# Patient Record
Sex: FEMALE | Employment: FULL TIME | URBAN - METROPOLITAN AREA
[De-identification: names, ages, dates, MRNs, and addresses within clinical notes are randomized per-mention and may not be internally consistent; named-entity substitution may affect disease eponyms.]

---

## 2024-01-23 ENCOUNTER — TELEPHONE (OUTPATIENT)
Dept: OBGYN CLINIC | Facility: CLINIC | Age: 19
End: 2024-01-23

## 2024-02-01 NOTE — TELEPHONE ENCOUNTER
Patient states 2023 had a Normal/Vaginal Delivery with [facility], prior Delivery and recent lab reports requested.

## 2024-02-06 ENCOUNTER — OFFICE VISIT (OUTPATIENT)
Dept: OBGYN CLINIC | Facility: CLINIC | Age: 19
End: 2024-02-06
Payer: COMMERCIAL

## 2024-02-06 VITALS
BODY MASS INDEX: 31.71 KG/M2 | SYSTOLIC BLOOD PRESSURE: 120 MMHG | HEIGHT: 63 IN | DIASTOLIC BLOOD PRESSURE: 60 MMHG | WEIGHT: 179 LBS

## 2024-02-06 DIAGNOSIS — Z34.91 PREGNANCY WITH UNCERTAIN DATES IN FIRST TRIMESTER: Primary | ICD-10-CM

## 2024-02-06 PROCEDURE — 99203 OFFICE O/P NEW LOW 30 MIN: CPT | Performed by: OBSTETRICS & GYNECOLOGY

## 2024-02-06 PROCEDURE — 76801 OB US < 14 WKS SINGLE FETUS: CPT | Performed by: OBSTETRICS & GYNECOLOGY

## 2024-02-06 PROCEDURE — 76817 TRANSVAGINAL US OBSTETRIC: CPT | Performed by: OBSTETRICS & GYNECOLOGY

## 2024-02-06 RX ORDER — PROGESTERONE 200 MG/1
CAPSULE ORAL EVERY 12 HOURS
COMMUNITY
Start: 2024-01-26

## 2024-02-06 NOTE — ASSESSMENT & PLAN NOTE
I discussed findings of ultrasound with patient. I discussed either miscarriage or just early pregnancy. She will RTO in 1 week for repeat u/s. If no change, then will discuss next steps.

## 2024-02-06 NOTE — PROGRESS NOTES
[facility] [place]  [address] [place] [address]    Assessment/Plan:  1. Pregnancy with uncertain dates in first trimester  Assessment & Plan:  I discussed findings of ultrasound with patient. I discussed either miscarriage or just early pregnancy. She will RTO in 1 week for repeat u/s. If no change, then will discuss next steps.     Orders:  -     AMB US OB < 14 weeks single or first gestation level 1        Subjective:   Janice Wallace is a 18 y.o.  .  CC: No chief complaint on file.      HPI: 19yo  @ 7wks by dates presents for an early u/s. She was seen with a previous gyn who started her on progesterone supplements due to progesterone dropping from 9 to 7. She reports no complaints. She was on OCPs, stopped for a month and then got pregnant. She has a 10mo old daughter at home.     ROS: Negative except as noted in HPI    Patient's last menstrual period was 12/15/2023.       She  has no history on file for sexual activity.       The following portions of the patient's history were reviewed and updated as appropriate:   History reviewed. No pertinent past medical history.  History reviewed. No pertinent surgical history.  History reviewed. No pertinent family history.  Social History     Socioeconomic History    Marital status: /Civil Union     Spouse name: None    Number of children: None    Years of education: None    Highest education level: None   Occupational History    None   Tobacco Use    Smoking status: Never     Passive exposure: Never    Smokeless tobacco: Never   Substance and Sexual Activity    Alcohol use: None    Drug use: None    Sexual activity: None   Other Topics Concern    None   Social History Narrative    None     Social Determinants of Health     Financial Resource Strain: Not on file   Food Insecurity: Not on file   Transportation Needs: Not on file   Physical Activity: Not on file   Stress: Not on file   Social Connections: Not on file  "  Intimate Partner Violence: Not on file   Housing Stability: Not on file     Outpatient Medications Marked as Taking for the 2/6/24 encounter (Office Visit) with Irving WALTER DO   Medication    Progesterone 200 MG CAPS     No Known Allergies        Objective:  /60 (BP Location: Right arm, Patient Position: Sitting, Cuff Size: Standard)   Ht 5' 3\" (1.6 m)   Wt 81.2 kg (179 lb)   LMP 12/15/2023   BMI 31.71 kg/m²        Chaperone present? Pt declined.    General Appearance: alert and oriented, in no acute distress.   Abdomen: Soft, non-tender, non-distended, no masses, no rebound or guarding.  Pelvic u/s: IUP with yolk sac and very small fetal pole. No cardiac motion seen, however was difficult exam  Extremities: Normal range of motion.   Skin: normal, no rash or abnormalities  Neurologic: alert, oriented x3  Psychiatric: Appropriate affect, mood stable, cooperative with exam.        Irving Joya DO  2/6/2024 2:25 PM   "

## 2024-02-14 ENCOUNTER — TELEPHONE (OUTPATIENT)
Dept: OBGYN CLINIC | Facility: CLINIC | Age: 19
End: 2024-02-14

## 2024-02-14 ENCOUNTER — OFFICE VISIT (OUTPATIENT)
Dept: OBGYN CLINIC | Facility: CLINIC | Age: 19
End: 2024-02-14
Payer: COMMERCIAL

## 2024-02-14 VITALS
DIASTOLIC BLOOD PRESSURE: 78 MMHG | HEIGHT: 63 IN | WEIGHT: 180 LBS | BODY MASS INDEX: 31.89 KG/M2 | SYSTOLIC BLOOD PRESSURE: 130 MMHG

## 2024-02-14 DIAGNOSIS — Z3A.01 6 WEEKS GESTATION OF PREGNANCY: ICD-10-CM

## 2024-02-14 DIAGNOSIS — Z34.81 ENCOUNTER FOR SUPERVISION OF OTHER NORMAL PREGNANCY, FIRST TRIMESTER: ICD-10-CM

## 2024-02-14 DIAGNOSIS — N91.1 AMENORRHEA, SECONDARY: Primary | ICD-10-CM

## 2024-02-14 PROCEDURE — 76817 TRANSVAGINAL US OBSTETRIC: CPT | Performed by: NURSE PRACTITIONER

## 2024-02-14 PROCEDURE — 99213 OFFICE O/P EST LOW 20 MIN: CPT | Performed by: NURSE PRACTITIONER

## 2024-02-14 RX ORDER — CHOLECALCIFEROL (VITAMIN D3) 25 MCG
TABLET,CHEWABLE ORAL
COMMUNITY

## 2024-02-14 NOTE — PROGRESS NOTES
"[facility] - [place]  [address] [place] [address]    Assessment/Plan:  Reviewed general pregnancy care, warning signs.  Nurse intake visit in 2 weeks, initial OB visit in 4 weeks.   Schedule appointment with MFM.    1. Amenorrhea, secondary    2. Encounter for supervision of other normal pregnancy, first trimester  -     [facility] OB < 14 weeks single or first gestation level 1  -     Ambulatory Referral to Maternal Fetal Medicine; Future; Expected date: 02/15/2024    3. 6 weeks gestation of pregnancy        Subjective:   Janice Wallace is a 18 y.o.  female.  CC: amenorrhea, positive pregnancy test      HPI:   18 year old  presents to office for follow up ultrasound.  Had positive pregnancy test and inconclusive ultrasound in office one week ago, which demonstrated a yolk sac and very small fetal pole, no FHR identified. Pt reports LMP 2023, placing her at 8 weeks.  She denies pain or bleeding and does have nausea and fatigue.          Gyn History  Patient's last menstrual period was 12/15/2023.       Last pap smear: Not on file    She  has no history on file for sexual activity.       OB History      No past medical history on file.     No past surgical history on file.     Social History     Tobacco Use    Smoking status: Never     Passive exposure: Never    Smokeless tobacco: Never          Current Outpatient Medications:     Prenatal Vit-Fe Sulfate-FA-DHA (Prenatal Vitamin/Min +DHA) 27-0.8-200 MG CAPS, Take by mouth, Disp: , Rfl:     Progesterone 200 MG CAPS, Every 12 hours, Disp: , Rfl:     She has No Known Allergies..    ROS: Review of Systems negative except as noted in HPI    Objective:  /78 (BP Location: Right arm, Patient Position: Sitting, Cuff Size: Standard)   Ht 5' 3\" (1.6 m)   Wt 81.6 kg (180 lb)   LMP 12/15/2023   BMI 31.89 kg/m²      Physical Exam  Constitutional:       General: She is not in acute distress.     Appearance: Normal " appearance.   Abdominal:      Palpations: Abdomen is soft.      Tenderness: There is no abdominal tenderness.      Comments: Transvaginal utlrasound demonstrates SIUP measuring 6 weeks 6 days by CRL, CM noted at 124 BPM.   Skin:     General: Skin is warm and dry.

## 2024-02-14 NOTE — TELEPHONE ENCOUNTER
2/14/24 patient has an upcoming intake appointment on 2/28/24 at 1:00 pm in person and initial appointment with Dr. Joya  3/21/24 at 1:30 pm in [place].

## 2024-02-14 NOTE — PATIENT INSTRUCTIONS
Reviewed general pregnancy care, warning signs.  Nurse intake visit in 2 weeks, initial OB visit in 4 weeks.   Schedule appointment with MFM.

## 2024-02-28 ENCOUNTER — PATIENT OUTREACH (OUTPATIENT)
Dept: OBGYN CLINIC | Facility: CLINIC | Age: 19
End: 2024-02-28

## 2024-02-28 ENCOUNTER — INITIAL PRENATAL (OUTPATIENT)
Dept: OBGYN CLINIC | Facility: CLINIC | Age: 19
End: 2024-02-28
Payer: COMMERCIAL

## 2024-02-28 VITALS
HEIGHT: 62 IN | SYSTOLIC BLOOD PRESSURE: 122 MMHG | DIASTOLIC BLOOD PRESSURE: 78 MMHG | WEIGHT: 178 LBS | BODY MASS INDEX: 32.76 KG/M2

## 2024-02-28 DIAGNOSIS — Z83.3 FAMILY HISTORY OF DIABETES MELLITUS TYPE II: ICD-10-CM

## 2024-02-28 DIAGNOSIS — Z36.89 ENCOUNTER FOR OTHER SPECIFIED ANTENATAL SCREENING: ICD-10-CM

## 2024-02-28 DIAGNOSIS — Z3A.09 9 WEEKS GESTATION OF PREGNANCY: ICD-10-CM

## 2024-02-28 DIAGNOSIS — E66.09 OTHER OBESITY DUE TO EXCESS CALORIES AFFECTING PREGNANCY IN FIRST TRIMESTER: Primary | ICD-10-CM

## 2024-02-28 DIAGNOSIS — O99.211 OTHER OBESITY DUE TO EXCESS CALORIES AFFECTING PREGNANCY IN FIRST TRIMESTER: Primary | ICD-10-CM

## 2024-02-28 PROCEDURE — T1001 NURSING ASSESSMENT/EVALUATN: HCPCS | Performed by: OBSTETRICS & GYNECOLOGY

## 2024-02-28 NOTE — PROGRESS NOTES
SW referred for initial prenatal assessment. Patient is . Patient agreeable to meeting with SW today.    Patient reports this pregnancy is unplanned but welcomed. She and FOB ( Juno) live in [place] with their 10 month old daughter Gigi. Patient and FOB both drive. 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 Patient denies current financial concerns or need for parenting education or baby supply resources. Already has MA ([facility]) and [place] - provided patient with [employer] flyer.  Nata stated she will provide patient with WIC letter at check out.     Patient denies current or h/o mental health, substance use, DV/IPV, CYS involvement, and legal concerns. Patient indicates good support from FOB, her father, step mom, sister, and mom (does not live locally). Patient enjoys watching TV and relaxing on the couch for stress relief.     No SW needs identified at this time, SW closing referral. Please re-refer as needed.

## 2024-02-28 NOTE — PROGRESS NOTES
OB INTAKE INTERVIEW  Patient is 18 y.o.y.o. who presents for OB intake at 8.6  wks.  Pt is a transfer from [facility]. Pt desires to establish care with Shira.    She is accompanied by herself during this encounter  The father of her baby Juno is involved in the pregnancy and is 24 years old.    G 3P1SAB 1  Last Menstrual Period: 23  Ultrasound: Measured done on 24 by Sandra Naidu:  IMPRESSION:    Will assign dating based on today's ultrasound.  Final CHRISTOPHER: 10/03/2024  Gestational age: 6 weeks 6 days  Fetal cardiac activity detected.  No adnexal masses seen.     Estimated Date of Delivery: 10/03/24 confirmed by US     Signs/Symptoms of Pregnancy  Current pregnancy symptoms:  Nausea: Advised feeling nauseous during the first three months of pregnancy is common. Try to eat 5-6 small meals a day, increase protein with meals/snacks, in order to keep stomach full at all times. Try bland foods like plain crackers, toast as well as carbonated drinks like gingerale. Hard peppermint or campos candy, is a natural treatment for nausea,  B- pops  with B6 ( available at the pharmacy), B6 25 mg in the AM and 25 mg in PM. May combine with Unisom 12.5mg at night. Unisom may cause drowsiness.  High complex carbohydrate before bedtime. If symptoms worsen, unable to keep fluids down without vomiting for more than 12 hours, contact the office.   Constipation no  Headaches no  Cramping/spotting no  PICA cravings no    Diabetes-  There is no height or weight on file to calculate BMI.  If patient has 1 or more, please order early 1 hour GTT  History of GDM no  BMI >35 no  History of PCOS or current metformin use no  History of LGA/macrosomic infant (4000g/9lbs) no    If patient has 2 or more, please order early 1 hour GTT  BMI>30 YES, BMI- 32.56   AMA no  First degree relative with type 2 diabetes YES  History of chronic HTN, hyperlipidemia, elevated A1C no  High risk race (, ,  ,  or ) YES,      Hypertension- if you answer yes to any of the following, please order baseline preeclampsia labs (cbc, comprehensive metabolic panel, urine protein creatinine ratio, uric acid)  History of of chronic HTN no  History of gestational HTN no  History of preeclampsia, eclampsia, or HELLP syndrome no  History of diabetes no  History of lupus, autoimmune disease, kidney disease no    Thyroid- if yes order TSH with reflex T4  History of thyroid disease no    Bleeding Disorder or Hx of DVT-patient or first degree relative with history of. Order the following if not done previously.   (Factor V, antithrombin III, prothrombin gene mutation, protein C and S Ag, lupus anticoagulant, anticardiolipin, beta-2 glycoprotein)   no    OB/GYN-  History of abnormal pap smear no, Pt is 18 yrs of age, pap deferred until 19 yrs of age        Date of last pap smear N/A, Pt will need GC/Chlamydia swab at 3/21/24 appointment   History of HPV no  History of Herpes/HSV no  History of other STI (gonorrhea, chlamydia, trich) no  History of prior  YES-2023  History of prior  no  History of  delivery prior to 36 weeks 6 days no  History of blood transfusion no  Ok for blood transfusion Yes     Substance screening-   History of tobacco use no  Currently using tobacco no  Substance Use Screen Level: Not a current or former tobacco user    MRSA Screening-   Does the pt have a hx of MRSA? no    Immunizations:  Influenza vaccine given this season Discussed  Discussed Tdap vaccine Discussed    Discussed COVID Vaccine Discussed     Genetic/MFM-  Do you or your partner have a history of any of the following in yourselves or first degree relatives?  Cystic fibrosis no  Spinal muscular atrophy no  Hemoglobinopathy/Sickle Cell/Thalassemia no  Fragile X Intellectual Disability no    If yes, discuss Carrier Screening and recommend consultation with MFM/Genetic Counseling and place  specific [facility] Referral for.    If no, discuss Carrier Screening being completed once in a lifetime as a standard of care lab test. Place orders for Cystic Fibrosis Gene Test (NRO613) and Spinal Muscular Atrophy DNA ([id number])  SMA/CF screening  done 8/23/22-see scanned results      Appointment for Nuchal Translucency Ultrasound at [facility] scheduled for Pt has an appointment scheduled with [facility] on 3/20/24      Interview education  [facility] Pregnancy Essentials Book reviewed, discussed and attached to their AVS: Yes     Ambulatory referral placed for      Prenatal lab work scripts Yes   Extra labs ordered:  Early 1 hour gtt     Aspirin/Preeclampsia Screen    Risk Level Risk Factor Recommendation   LOW Prior Uncomplicated full-term delivery YES No Aspirin recommendation        MODERATE Nulliparity no Recommend low-dose aspirin if     BMI>30 YES 2 or more moderate risk factors    Family History Preeclampsia (mother/sister) no     35yr old or greater no      or Low Socioeconomic no     IVF Pregnancy  no     Personal History Risks (low birth weight, prior adverse preg outcome, >10yr preg interval) no         HIGH History of Preeclampsia no Recommend low-dose aspirin if     Multifetal gestation no 1 or more high risk factors    Chronic HTN no     Type 1 or 2 Diabetes no     Renal Disease no     Autoimmune Disease  no      Contraindications to ASA therapy:  NSAID/ ASA allergy: no  Nasal polyps: no  Asthma with history of ASA induced bronchospasm: no  Relative contraindications:  History of GI bleed: no  Active peptic ulcer disease: no  Severe hepatic dysfunction: no    Patient should be recommended to take ASA 162mg during this pregnancy from 12-36wks to lower her risk of preeclampsia: Low risk, to be further reviewed and discussed at Initial PN visit on 3/21/24      The patient has a history now or in prior pregnancy notable for:        Details that I feel the provider should be aware of:   Pt  delivered 4/2023, vaginal delivery, uncomplicated pregnancy course.    Early hour gtt ordered -see risk factors.     PN1 visit scheduled. The patient was oriented to our practice, the navigator role, reviewed delivering physicians and [facility] for Delivery. All questions were answered.    Interviewed by: NIALL Hendrickson RN    [FreeTextEntry1] : I reassured the patient that there was no need for testing at this point and she should followup in 9 months .\par BP well controlled.\par Lipids controlled\par Carotid doppler benign.\par EKG unchanged

## 2024-02-28 NOTE — PATIENT INSTRUCTIONS
Congratulations!! Please review our Pregnancy Essential Guide for informative education and here is a link to take a irtual tour of our [facility].     [facility] Pregnancy Essentials Guide  [facility] ([email or web address])       [facility] Tour on [facility]

## 2024-03-20 ENCOUNTER — ROUTINE PRENATAL (OUTPATIENT)
Dept: PERINATAL CARE | Facility: OTHER | Age: 19
End: 2024-03-20
Payer: COMMERCIAL

## 2024-03-20 ENCOUNTER — INITIAL PRENATAL (OUTPATIENT)
Dept: OBGYN CLINIC | Facility: CLINIC | Age: 19
End: 2024-03-20
Payer: COMMERCIAL

## 2024-03-20 VITALS
DIASTOLIC BLOOD PRESSURE: 52 MMHG | BODY MASS INDEX: 31.36 KG/M2 | WEIGHT: 177 LBS | HEIGHT: 63 IN | SYSTOLIC BLOOD PRESSURE: 110 MMHG

## 2024-03-20 VITALS
DIASTOLIC BLOOD PRESSURE: 70 MMHG | WEIGHT: 177.2 LBS | HEIGHT: 63 IN | HEART RATE: 95 BPM | SYSTOLIC BLOOD PRESSURE: 118 MMHG | BODY MASS INDEX: 31.4 KG/M2

## 2024-03-20 DIAGNOSIS — Z3A.11 11 WEEKS GESTATION OF PREGNANCY: ICD-10-CM

## 2024-03-20 DIAGNOSIS — Z36.82 ENCOUNTER FOR NUCHAL TRANSLUCENCY TESTING: ICD-10-CM

## 2024-03-20 DIAGNOSIS — Z11.3 SCREENING EXAMINATION FOR VENEREAL DISEASE: ICD-10-CM

## 2024-03-20 DIAGNOSIS — Z34.81 PRENATAL CARE, SUBSEQUENT PREGNANCY, FIRST TRIMESTER: Primary | ICD-10-CM

## 2024-03-20 DIAGNOSIS — Z34.81 ENCOUNTER FOR SUPERVISION OF OTHER NORMAL PREGNANCY, FIRST TRIMESTER: ICD-10-CM

## 2024-03-20 DIAGNOSIS — Z34.91 PREGNANCY WITH UNCERTAIN DATES IN FIRST TRIMESTER: Primary | ICD-10-CM

## 2024-03-20 LAB
EXTERNAL CHLAMYDIA SCREEN: NEGATIVE
EXTERNAL GONORRHEA SCREEN: NEGATIVE
SL AMB  POCT GLUCOSE, UA: NEGATIVE
SL AMB POCT URINE PROTEIN: NEGATIVE

## 2024-03-20 PROCEDURE — 76813 OB US NUCHAL MEAS 1 GEST: CPT | Performed by: OBSTETRICS & GYNECOLOGY

## 2024-03-20 PROCEDURE — 76801 OB US < 14 WKS SINGLE FETUS: CPT | Performed by: OBSTETRICS & GYNECOLOGY

## 2024-03-20 PROCEDURE — 36415 COLL VENOUS BLD VENIPUNCTURE: CPT | Performed by: OBSTETRICS & GYNECOLOGY

## 2024-03-20 PROCEDURE — 99243 OFF/OP CNSLTJ NEW/EST LOW 30: CPT | Performed by: OBSTETRICS & GYNECOLOGY

## 2024-03-20 PROCEDURE — 81002 URINALYSIS NONAUTO W/O SCOPE: CPT | Performed by: PHYSICIAN ASSISTANT

## 2024-03-20 PROCEDURE — 99213 OFFICE O/P EST LOW 20 MIN: CPT | Performed by: PHYSICIAN ASSISTANT

## 2024-03-20 NOTE — ASSESSMENT & PLAN NOTE
Reviewed can stop progesterone at 12 weeks.   STD cultures done. Initial prenatal labs reprinted for patient.   Reviewed sequential screen vs NIPT, has MFM appt this afternoon. Planning for NIPT. Reviewed AFP to evaluate for ONTD, will provide script at future visit.   Return to office for ob check in 4 weeks.

## 2024-03-20 NOTE — PROGRESS NOTES
"Janice presents today for a genetic screening ultrasound.  This is her third pregnancy.  Her first pregnancy resulted in a miscarriage followed by a full-term vaginal livery without complications approximately 8 months ago.  She has a BMI of 31.  She has no other significant contributory medical, surgical, substance use, or family history.  A review of systems is otherwise negative.    We discussed the options for genetic screening, including but not limited to first trimester screening, second trimester screening, combined first and second trimester screening, noninvasive prenatal screening (NIPS) for patients at high risk and diagnostic screening through the use of CVS and amniocentesis. We discussed the risks and benefits of each approach including the sensitivities and false positive rates as well as the difference between a screening test and a diagnostic test. At the conclusion of our discussion the patient elected noninvasive prenatal screening utilizing the MaterniT 21 plus test. The patient had this bloowork drawn in the office.   The results should be available in approximately 7-10 days.    The patient has a short interpregnancy interval of less than 9 months which is associated with increased risk for adverse pregnancy outcomes including but not limited to  labor and fetal growth restriction.  Recommend micronutrient supplementation utilizing prenatal vitamins and a follow-up 3rd trimester growth ultrasound.      We discussed follow-up in detail and I recommend an anatomy ultrasound be scheduled for 20 weeks gestation.    Thank you very much for allowing us to participate in the care of this very nice patient. Should you have any questions, please do not hesitate to contact me.    Portions of the record may have been created with voice recognition software. Occasional wrong word or \"sound a like\" substitutions may have occurred due to the inherent limitations of voice recognition software. Read " the chart carefully and recognize, using context, where substitutions have occurred.

## 2024-03-20 NOTE — PROGRESS NOTES
Patient chose to have [facility] [id number] Non-Invasive Prenatal Screen [id number] PLUS w/ SCA, WITH fetal sex (per pt request).  Patient choose billed through insurance.     Patient given brochure and is aware [facility] will contact patient's insurance and coordinate coverage.  Provided [facility] contact information. General inquiries [phone], Cost estimates [phone] and [facility] Billing [phone]. Website [email or web address].     Blood collection tubes labeled with patient identifiers (name, medical record number, and date of birth).     Filled out [facility] order form. Patient chose to have blood drawn in our Maternal Fetal Medicine office. Blood work drawn by ANY Mirza MA.    If patient had blood work in office: Blood drawn from left arm. Needle used with a straight needle.     If patient chose to have blood work drawn at a [facility] lab we requested patient notify MFM (via phone call or [employer] message) when blood collected so office can follow up on results.     Copy of lab order scanned to Epic media.     Maternal Fetal Medicine will have results in approximately 5-7 business days and will call patient or notify via [employer].  Patient aware viewing lab result online will reveal fetal sex if ordered.    Patient verbalized understanding of all instructions and no questions at this time.

## 2024-03-20 NOTE — LETTER
2024     Sandra Naidu, HUMBERTO  [address]  [place] [address]    Patient: Janice Wallace   YOB: 2005   Date of Visit: 3/20/2024       Dear Dr. Naidu:    Thank you for referring Janice Wallace to me for evaluation. Below are my notes for this consultation.    If you have questions, please do not hesitate to call me. I look forward to following your patient along with you.         Sincerely,        Irwin Elena MD        CC: No Recipients    Irwin Elena MD  3/20/2024  3:26 PM  Sign when Signing Visit  Janice presents today for a genetic screening ultrasound.  This is her third pregnancy.  Her first pregnancy resulted in a miscarriage followed by a full-term vaginal livery without complications approximately 8 months ago.  She has a BMI of 31.  She has no other significant contributory medical, surgical, substance use, or family history.  A review of systems is otherwise negative.    We discussed the options for genetic screening, including but not limited to first trimester screening, second trimester screening, combined first and second trimester screening, noninvasive prenatal screening (NIPS) for patients at high risk and diagnostic screening through the use of CVS and amniocentesis. We discussed the risks and benefits of each approach including the sensitivities and false positive rates as well as the difference between a screening test and a diagnostic test. At the conclusion of our discussion the patient elected noninvasive prenatal screening utilizing the MaterniT 21 plus test. The patient had this bloowork drawn in the office.   The results should be available in approximately 7-10 days.    The patient has a short interpregnancy interval of less than 9 months which is associated with increased risk for adverse pregnancy outcomes including but not limited to  labor and fetal growth restriction.  Recommend  "micronutrient supplementation utilizing prenatal vitamins and a follow-up 3rd trimester growth ultrasound.      We discussed follow-up in detail and I recommend an anatomy ultrasound be scheduled for 20 weeks gestation.    Thank you very much for allowing us to participate in the care of this very nice patient. Should you have any questions, please do not hesitate to contact me.    Portions of the record may have been created with voice recognition software. Occasional wrong word or \"sound a like\" substitutions may have occurred due to the inherent limitations of voice recognition software. Read the chart carefully and recognize, using context, where substitutions have occurred.     "

## 2024-03-20 NOTE — PROGRESS NOTES
"Routine Prenatal Visit  [facility] [place]  [address] [place] [address]    Assessment/Plan:  Janice is a 18 y.o. year old  at 11w6d who presents for routine prenatal visit.     1. Prenatal care, subsequent pregnancy, first trimester    2. 11 weeks gestation of pregnancy  Assessment & Plan:  Reviewed can stop progesterone at 12 weeks.   STD cultures done. Initial prenatal labs reprinted for patient.   Reviewed sequential screen vs NIPT, has MFM appt this afternoon. Planning for NIPT. Reviewed AFP to evaluate for ONTD, will provide script at future visit.   Return to office for ob check in 4 weeks.       Orders:  -     POCT urine dip    3. Screening examination for venereal disease  -     Chlamydia/GC CRYSTAL, Confirmation        Next OB Visit 4 weeks.    Subjective:     CC: Prenatal care    Janice Wallace is a 18 y.o.  female who presents for routine prenatal care at 11w6d.  Pregnancy ROS: Reports nausea, no vomiting. No FM, HA, cramping, VB, LOF, edema, domestic violence, or smoking. Tolerating PNV.      The following portions of the patient's history were reviewed and updated as appropriate: allergies, current medications, past family history, past medical history, obstetric history, gynecologic history, past social history, past surgical history and problem list.      Objective:  /52   Ht 5' 3\" (1.6 m)   Wt 80.3 kg (177 lb)   LMP 2023   BMI 31.35 kg/m²   Pregravid Weight/BMI: Pregravid weight not on file (BMI Could not be calculated)  Current Weight: 80.3 kg (177 lb)   Total Weight Gain: Not found.   Pre-Christina Vitals      Flowsheet Row Most Recent Value   Prenatal Assessment    Fetal Heart Rate 150   Fundal Height (cm) 12 cm   Movement Absent   Prenatal Vitals    Blood Pressure 110/52   Weight - Scale 80.3 kg (177 lb)   Urine Albumin/Glucose    Dilation/Effacement/Station    Vaginal Drainage    Edema    LLE Edema None   RLE Edema None   Facial Edema " None             General: Well appearing, no distress  Abdomen: Soft, gravid, nontender  Fundal Height: Appropriate for gestational age.  Extremities: Warm and well perfused.  Non tender.

## 2024-03-25 LAB
C TRACH RRNA SPEC QL NAA+PROBE: NEGATIVE
CFDNA.FET/CFDNA.TOTAL SFR FETUS: NORMAL %
CITATION REF LAB TEST: NORMAL
FET 13+18+21+X+Y ANEUP PLAS.CFDNA: NEGATIVE
FET CHR 21 TS PLAS.CFDNA QL: NEGATIVE
FET CHR 21 TS PLAS.CFDNA QL: NEGATIVE
FET MS X RISK WBC.DNA+CFDNA QL: NOT DETECTED
FET SEX PLAS.CFDNA DOSAGE CFDNA: NORMAL
FET TS 13 RISK PLAS.CFDNA QL: NEGATIVE
FET X + Y ANEUP RISK PLAS.CFDNA SEQ-IMP: NOT DETECTED
GA EST FROM CONCEPTION DATE: NORMAL D
GESTATIONAL AGE > 9:: YES
LAB DIRECTOR NAME PROVIDER: NORMAL
LAB DIRECTOR NAME PROVIDER: NORMAL
LABORATORY COMMENT REPORT: NORMAL
LIMITATIONS OF THE TEST: NORMAL
N GONORRHOEA RRNA SPEC QL NAA+PROBE: NEGATIVE
NEGATIVE PREDICTIVE VALUE: NORMAL
PERFORMANCE CHARACTERISTICS: NORMAL
POSITIVE PREDICTIVE VALUE: NORMAL
REF LAB TEST METHOD: NORMAL
SL AMB NOTE:: NORMAL
TEST PERFORMANCE INFO SPEC: NORMAL

## 2024-03-27 NOTE — RESULT ENCOUNTER NOTE
I have reviewed the results of the NIPS which are low risk.  Please call patient and notify her of these reassuring results if she has not viewed on MyChart. Please ensure she is notified of recommendation of MSAFP to be ordered and followed up through her primary Obstetrician's office.      Thank you, Irwin Elena MD

## 2024-04-05 ENCOUNTER — TELEPHONE (OUTPATIENT)
Dept: OBGYN CLINIC | Facility: CLINIC | Age: 19
End: 2024-04-05

## 2024-04-05 NOTE — TELEPHONE ENCOUNTER
"Overall how are you doing? Feeling good     Compliant with routine OB care appointments? Yes     Have you completed your 1st trimester labs? Pt has not completed First PN labs, pt states, \" she forgot\" encouraged to have done prior to her appointment on 4/16/24. Pt verbalized abn understanding.     If you had NIPS with MFM, do you have a order for MSAFP? To be ordered 4/16/24   Can be completed 15w-22w9d, ideally 16w-18w    Have you seen MFM and do you have your detailed US scheduled? 5/21/24     Pregnancy Education-have you had a chance to review the classes offered and registered? Reviewed classes    EPDS Score 2  "

## 2024-04-10 NOTE — TELEPHONE ENCOUNTER
First PN labs results still no completed. Follow-up call made to patient, pt informed she is having her blood work drawn today.

## 2024-04-15 LAB
EXTERNAL ANTIBODY SCREEN: NORMAL
EXTERNAL HEMOGLOBIN: 11.6 G/DL
EXTERNAL HEPATITIS B SURFACE ANTIGEN: NEGATIVE
EXTERNAL HIV-1/2 AB-AG: NORMAL
EXTERNAL PLATELET COUNT: 275 K/ÂΜL
EXTERNAL RH FACTOR: POSITIVE
EXTERNAL RUBELLA IGG QUANTITATION: NORMAL
EXTERNAL SYPHILIS TOTAL IGG/IGM SCREENING: NORMAL

## 2024-04-16 ENCOUNTER — ROUTINE PRENATAL (OUTPATIENT)
Dept: OBGYN CLINIC | Facility: CLINIC | Age: 19
End: 2024-04-16
Payer: COMMERCIAL

## 2024-04-16 VITALS
HEIGHT: 63 IN | DIASTOLIC BLOOD PRESSURE: 76 MMHG | BODY MASS INDEX: 31.71 KG/M2 | WEIGHT: 179 LBS | SYSTOLIC BLOOD PRESSURE: 120 MMHG

## 2024-04-16 DIAGNOSIS — Z3A.15 15 WEEKS GESTATION OF PREGNANCY: ICD-10-CM

## 2024-04-16 DIAGNOSIS — O09.892 SHORT INTERVAL BETWEEN PREGNANCIES AFFECTING PREGNANCY IN SECOND TRIMESTER, ANTEPARTUM: Primary | ICD-10-CM

## 2024-04-16 DIAGNOSIS — O99.212 SEVERE OBESITY DUE TO EXCESS CALORIES AFFECTING PREGNANCY IN SECOND TRIMESTER (HCC): ICD-10-CM

## 2024-04-16 DIAGNOSIS — E66.01 SEVERE OBESITY DUE TO EXCESS CALORIES AFFECTING PREGNANCY IN SECOND TRIMESTER (HCC): ICD-10-CM

## 2024-04-16 LAB
SL AMB  POCT GLUCOSE, UA: NORMAL
SL AMB POCT URINE PROTEIN: NORMAL

## 2024-04-16 PROCEDURE — 81002 URINALYSIS NONAUTO W/O SCOPE: CPT | Performed by: OBSTETRICS & GYNECOLOGY

## 2024-04-16 PROCEDURE — 99213 OFFICE O/P EST LOW 20 MIN: CPT | Performed by: OBSTETRICS & GYNECOLOGY

## 2024-04-16 NOTE — PROGRESS NOTES
"Routine Prenatal Visit  [facility] - [place]  [address] [place] [address]    Assessment/Plan:  Janice is a 18 y.o. year old  at 15w5d who presents for routine prenatal visit.     1. Short interval between pregnancies affecting pregnancy in second trimester, antepartum  Assessment & Plan:  Will get repeat growth u/s in 3rd trimester      2. 15 weeks gestation of pregnancy  -     POCT urine dip  -     Alpha fetoprotein, maternal; Future  -     Alpha fetoprotein, maternal    3. Severe obesity due to excess calories affecting pregnancy in second trimester (HCC)          Subjective:   Janice Wallace is a 18 y.o.  who presents for routine prenatal care at 15w5d.  Complaints today: Denies. Went yesterday to have her labs done  LOF: -; VB: -; Contractions: -; FM: -    Objective:  /76 (BP Location: Right arm, Patient Position: Sitting, Cuff Size: Standard)   Ht 5' 3\" (1.6 m)   Wt 81.2 kg (179 lb)   LMP 2023   BMI 31.71 kg/m²     General: Well appearing, no distress  Respiratory: Unlabored breathing  Abdomen: Soft, gravid, nontender  Extremities: Warm and well perfused.  Non tender.    Pregravid Weight/BMI: Pregravid weight not on file (BMI Could not be calculated)  Current Weight: 81.2 kg (179 lb)   Total Weight Gain: Not found.     Pre-Christina Vitals      Flowsheet Row Most Recent Value   Prenatal Assessment    Fetal Heart Rate 155   Movement Absent   Prenatal Vitals    Blood Pressure 120/76   Weight - Scale 81.2 kg (179 lb)   Urine Albumin/Glucose    Dilation/Effacement/Station    Vaginal Drainage    Edema              Shil JOSE ANGEL Joya DO  2024 11:13 AM    "

## 2024-04-17 LAB
ABO GROUP BLD: ABNORMAL
APPEARANCE UR: CLEAR
BACTERIA UR CULT: ABNORMAL
BACTERIA UR CULT: NO GROWTH
BACTERIA URNS QL MICRO: NORMAL
BASOPHILS # BLD AUTO: 0 X10E3/UL (ref 0–0.2)
BASOPHILS NFR BLD AUTO: 0 %
BILIRUB UR QL STRIP: NEGATIVE
BLD GP AB SCN SERPL QL: NEGATIVE
C TRACH RRNA SPEC QL NAA+PROBE: NEGATIVE
CASTS URNS QL MICRO: NORMAL /LPF
COLOR UR: YELLOW
EOSINOPHIL # BLD AUTO: 0.3 X10E3/UL (ref 0–0.4)
EOSINOPHIL NFR BLD AUTO: 3 %
EPI CELLS #/AREA URNS HPF: NORMAL /HPF (ref 0–10)
ERYTHROCYTE [DISTWIDTH] IN BLOOD BY AUTOMATED COUNT: 14.1 % (ref 11.7–15.4)
GLUCOSE 1H P 50 G GLC PO SERPL-MCNC: 86 MG/DL (ref 70–139)
GLUCOSE UR QL: NEGATIVE
HBV SURFACE AG SERPL QL IA: NEGATIVE
HCT VFR BLD AUTO: 36.3 % (ref 34–46.6)
HCV AB S/CO SERPL IA: NON REACTIVE
HGB BLD-MCNC: 11.6 G/DL (ref 11.1–15.9)
HGB UR QL STRIP: NEGATIVE
HIV 1+2 AB+HIV1 P24 AG SERPL QL IA: NON REACTIVE
IMM GRANULOCYTES # BLD: 0.1 X10E3/UL (ref 0–0.1)
IMM GRANULOCYTES NFR BLD: 1 %
KETONES UR QL STRIP: NEGATIVE
LEUKOCYTE ESTERASE UR QL STRIP: NEGATIVE
LYMPHOCYTES # BLD AUTO: 2.6 X10E3/UL (ref 0.7–3.1)
LYMPHOCYTES NFR BLD AUTO: 26 %
MCH RBC QN AUTO: 25.6 PG (ref 26.6–33)
MCHC RBC AUTO-ENTMCNC: 32 G/DL (ref 31.5–35.7)
MCV RBC AUTO: 80 FL (ref 79–97)
MICRO URNS: ABNORMAL
MICRO URNS: ABNORMAL
MONOCYTES # BLD AUTO: 0.7 X10E3/UL (ref 0.1–0.9)
MONOCYTES NFR BLD AUTO: 7 %
N GONORRHOEA RRNA SPEC QL NAA+PROBE: NEGATIVE
NEUTROPHILS # BLD AUTO: 6.3 X10E3/UL (ref 1.4–7)
NEUTROPHILS NFR BLD AUTO: 63 %
NITRITE UR QL STRIP: NEGATIVE
PH UR STRIP: 6 [PH] (ref 5–7.5)
PLATELET # BLD AUTO: 275 X10E3/UL (ref 150–450)
PROT UR QL STRIP: NEGATIVE
RBC # BLD AUTO: 4.53 X10E6/UL (ref 3.77–5.28)
RBC #/AREA URNS HPF: NORMAL /HPF (ref 0–2)
RH BLD: POSITIVE
RPR SER QL: NON REACTIVE
RUBV IGG SERPL IA-ACNC: 1.18 INDEX
SL AMB INTERPRETATION: NORMAL
SP GR UR: 1.02 (ref 1–1.03)
UROBILINOGEN UR STRIP-ACNC: 0.2 MG/DL (ref 0.2–1)
WBC # BLD AUTO: 10 X10E3/UL (ref 3.4–10.8)
WBC #/AREA URNS HPF: NORMAL /HPF (ref 0–5)

## 2024-04-22 LAB
2ND TRIMESTER 4 SCREEN SERPL-IMP: NORMAL
AFP ADJ MOM SERPL: 0.88
AFP INTERP AMN-IMP: NORMAL
AFP INTERP SERPL-IMP: NORMAL
AFP INTERP SERPL-IMP: NORMAL
AFP SERPL-MCNC: 25.3 NG/ML
AGE AT DELIVERY: 21.1 YR
GA METHOD: NORMAL
GA: 15.7 WEEKS
IDDM PATIENT QL: NO
MULTIPLE PREGNANCY: NO
NEURAL TUBE DEFECT RISK FETUS: NORMAL %

## 2024-05-15 ENCOUNTER — ROUTINE PRENATAL (OUTPATIENT)
Dept: OBGYN CLINIC | Facility: CLINIC | Age: 19
End: 2024-05-15
Payer: COMMERCIAL

## 2024-05-15 VITALS
WEIGHT: 181 LBS | DIASTOLIC BLOOD PRESSURE: 74 MMHG | SYSTOLIC BLOOD PRESSURE: 112 MMHG | BODY MASS INDEX: 32.07 KG/M2 | HEIGHT: 63 IN

## 2024-05-15 DIAGNOSIS — O26.899 PELVIC PAIN IN PREGNANCY: ICD-10-CM

## 2024-05-15 DIAGNOSIS — Z3A.19 19 WEEKS GESTATION OF PREGNANCY: ICD-10-CM

## 2024-05-15 DIAGNOSIS — R10.2 PELVIC PAIN IN PREGNANCY: ICD-10-CM

## 2024-05-15 DIAGNOSIS — O09.892 SHORT INTERVAL BETWEEN PREGNANCIES AFFECTING PREGNANCY IN SECOND TRIMESTER, ANTEPARTUM: Primary | ICD-10-CM

## 2024-05-15 DIAGNOSIS — E66.01 SEVERE OBESITY DUE TO EXCESS CALORIES AFFECTING PREGNANCY IN SECOND TRIMESTER (HCC): ICD-10-CM

## 2024-05-15 DIAGNOSIS — O99.212 SEVERE OBESITY DUE TO EXCESS CALORIES AFFECTING PREGNANCY IN SECOND TRIMESTER (HCC): ICD-10-CM

## 2024-05-15 DIAGNOSIS — M54.9 BACK PAIN IN PREGNANCY: ICD-10-CM

## 2024-05-15 DIAGNOSIS — O99.891 BACK PAIN IN PREGNANCY: ICD-10-CM

## 2024-05-15 LAB
SL AMB  POCT GLUCOSE, UA: NORMAL
SL AMB POCT URINE PROTEIN: NORMAL

## 2024-05-15 PROCEDURE — 99213 OFFICE O/P EST LOW 20 MIN: CPT | Performed by: STUDENT IN AN ORGANIZED HEALTH CARE EDUCATION/TRAINING PROGRAM

## 2024-05-15 PROCEDURE — 81002 URINALYSIS NONAUTO W/O SCOPE: CPT | Performed by: STUDENT IN AN ORGANIZED HEALTH CARE EDUCATION/TRAINING PROGRAM

## 2024-05-15 NOTE — PROGRESS NOTES
"Routine Prenatal Visit  [facility] - [address], [address], [place] [address]    Assessment/Plan:  Janice is a 18 y.o. year old  at 19w6d who presents for routine prenatal visit.     1. Short interval between pregnancies affecting pregnancy in second trimester, antepartum  2. Severe obesity due to excess calories affecting pregnancy in second trimester (HCC)  3. 19 weeks gestation of pregnancy  Assessment & Plan:  - Continue prenatal vitamin with folic acid.  - Aneuploidy screening low risk. msAFP low risk.   - Level II anatomy ultrasound scheduled with Maternal Fetal Medicine.  - Problem list updated, results console reviewed and updated with pertinent prenatal labs.  - PMH, PSH, medications reviewed and updated as needed.  - Return to office in 4 wk(s) for routine prenatal care.    Orders:  -     POCT urine dip  4. Pelvic pain in pregnancy  Assessment & Plan:  - Referred to PT for evaluation and treatment in the setting of pelvic and back pain.   Orders:  -     Ambulatory Referral to Physical Therapy; Future  5. Back pain in pregnancy  -     Ambulatory Referral to Physical Therapy; Future        Subjective:   Janice Wallace is a 18 y.o.  who presents for routine prenatal care at 19w6d.  Complaints today: No  LOF: No; VB: No; Contractions: No; FM: Flutters    Objective:  /74 (BP Location: Left arm, Patient Position: Sitting, Cuff Size: Standard)   Ht 5' 3\" (1.6 m)   Wt 82.1 kg (181 lb)   LMP 2023   BMI 32.06 kg/m²     General: Well appearing, no distress  Respiratory: Unlabored breathing  Cardiovascular: Regular rate.  Abdomen: Soft, gravid, nontender  Extremities: Warm and well perfused.  Non tender.    Pregravid Weight/BMI: Pregravid weight not on file (BMI Could not be calculated)  Current Weight: 82.1 kg (181 lb)   Total Weight Gain: Not found.     Pre- Vitals      Flowsheet Row Most Recent Value   Prenatal Assessment    Fetal Heart " Rate 150   Fundal Height (cm) 20 cm   Movement Present   Prenatal Vitals    Blood Pressure 112/74   Weight - Scale 82.1 kg (181 lb)   Urine Albumin/Glucose    Dilation/Effacement/Station    Vaginal Drainage    Draining Fluid No   Edema    LLE Edema None   RLE Edema None   Facial Edema None             Kevin Rodas MD  5/15/2024 6:49 PM

## 2024-05-15 NOTE — ASSESSMENT & PLAN NOTE
- Continue prenatal vitamin with folic acid.  - Aneuploidy screening low risk. msAFP low risk.   - Level II anatomy ultrasound scheduled with Maternal Fetal Medicine.  - Problem list updated, results console reviewed and updated with pertinent prenatal labs.  - PMH, PSH, medications reviewed and updated as needed.  - Return to office in 4 wk(s) for routine prenatal care.

## 2024-05-21 ENCOUNTER — TELEPHONE (OUTPATIENT)
Age: 19
End: 2024-05-21

## 2024-05-21 NOTE — TELEPHONE ENCOUNTER
Pt arrived at 2:50 PM for 2:30 PM detailed ultrasound with MFM. Pt was upset that she had driven an hour for her appt and is unable to be seen has she was 20 minutes late for her appt. Apologized to pt and explained late policy.    Appts offered:  5/24 8 AM and 12:45 PM at [place]  5/28 8 AM at [place]  5/29 8 AM at [place]  6/6 8 AM, 10:15 AM, and 12:45 PM at [place]  6/7 8 or 10:15 AM at [place]    Pt declined appts, she will be on vacation week of 5/25 and works Monday through Friday. Pt stated she would like to think about it and call back to reschedule.    DISCHARGE

## 2024-05-21 NOTE — TELEPHONE ENCOUNTER
Patient had a detailed scheduled for 6/3 but will not be able to make it. She is gong away 5/3-5/17. She prefers to be in the [facility] area. Seh mentioned that when she was in the office that there were a couple of appointments for next week, I did not see them.

## 2024-05-21 NOTE — TELEPHONE ENCOUNTER
Patient called in and offered multiple appts for patient :  05/24 8am, 12:45pm at [place]  05/28 8am [place]  05/29 8am 10:15am 2:30pm [place]      Patient states [place] office is too far and is not able to take any appts offered.    Patient goes on vacation from 06/03-06/17. Please call patient to schedule appt.

## 2024-05-21 NOTE — TELEPHONE ENCOUNTER
Pt called in and had concerns about appt at [facility] today.     Pt states that her appt was for 230 pm and arrived at 240 and that the  was checking in someone else that took a long time. No other staff spoke to her and that when they were done with that other pt it was 250 pm and they told her that she was late and could not be seen.     Pt has concerns and states that it is not her fault and that she was [facility] prior to cut off time and that she should not have to rearrange her schedule due to something that was not her fault.     Pt would like to speak to practice manager.   I called to [facility] and got someone on [facility] but was put on hold for 20 mins.    Can manager pleas contact pt at [phone]

## 2024-05-22 NOTE — TELEPHONE ENCOUNTER
Called patient back to offer [place] scan & send in the morning. Patient decided to keep her 2:30 pm appointment at [place].

## 2024-05-29 ENCOUNTER — ROUTINE PRENATAL (OUTPATIENT)
Dept: PERINATAL CARE | Facility: OTHER | Age: 19
End: 2024-05-29
Payer: COMMERCIAL

## 2024-05-29 VITALS
HEIGHT: 63 IN | WEIGHT: 184 LBS | SYSTOLIC BLOOD PRESSURE: 130 MMHG | HEART RATE: 95 BPM | BODY MASS INDEX: 32.6 KG/M2 | DIASTOLIC BLOOD PRESSURE: 60 MMHG

## 2024-05-29 DIAGNOSIS — E66.01 SEVERE OBESITY DUE TO EXCESS CALORIES AFFECTING PREGNANCY IN SECOND TRIMESTER (HCC): ICD-10-CM

## 2024-05-29 DIAGNOSIS — O09.892 SHORT INTERVAL BETWEEN PREGNANCIES AFFECTING PREGNANCY IN SECOND TRIMESTER, ANTEPARTUM: Primary | ICD-10-CM

## 2024-05-29 DIAGNOSIS — Z36.3 ENCOUNTER FOR ANTENATAL SCREENING FOR MALFORMATION: ICD-10-CM

## 2024-05-29 DIAGNOSIS — O99.212 SEVERE OBESITY DUE TO EXCESS CALORIES AFFECTING PREGNANCY IN SECOND TRIMESTER (HCC): ICD-10-CM

## 2024-05-29 DIAGNOSIS — Z36.86 ENCOUNTER FOR ANTENATAL SCREENING FOR CERVICAL LENGTH: ICD-10-CM

## 2024-05-29 DIAGNOSIS — Z3A.21 21 WEEKS GESTATION OF PREGNANCY: ICD-10-CM

## 2024-05-29 PROCEDURE — 99213 OFFICE O/P EST LOW 20 MIN: CPT | Performed by: OBSTETRICS & GYNECOLOGY

## 2024-05-29 PROCEDURE — 76817 TRANSVAGINAL US OBSTETRIC: CPT | Performed by: OBSTETRICS & GYNECOLOGY

## 2024-05-29 PROCEDURE — 76811 OB US DETAILED SNGL FETUS: CPT | Performed by: OBSTETRICS & GYNECOLOGY

## 2024-05-29 NOTE — PROGRESS NOTES
"[facility]: Janice Wallace was seen today for anatomic survey and cervical length screening ultrasound.  See ultrasound report under \"OB Procedures\" tab.   Please don't hesitate to contact our office with any concerns or questions.  -Jasmyn Garcia MD    "

## 2024-06-19 ENCOUNTER — ROUTINE PRENATAL (OUTPATIENT)
Dept: OBGYN CLINIC | Facility: CLINIC | Age: 19
End: 2024-06-19
Payer: COMMERCIAL

## 2024-06-19 ENCOUNTER — TELEPHONE (OUTPATIENT)
Age: 19
End: 2024-06-19

## 2024-06-19 VITALS — SYSTOLIC BLOOD PRESSURE: 110 MMHG | BODY MASS INDEX: 33.23 KG/M2 | DIASTOLIC BLOOD PRESSURE: 50 MMHG | WEIGHT: 187.6 LBS

## 2024-06-19 DIAGNOSIS — Z3A.24 24 WEEKS GESTATION OF PREGNANCY: Primary | ICD-10-CM

## 2024-06-19 DIAGNOSIS — O99.212 SEVERE OBESITY DUE TO EXCESS CALORIES AFFECTING PREGNANCY IN SECOND TRIMESTER (HCC): ICD-10-CM

## 2024-06-19 DIAGNOSIS — R21 RASH: ICD-10-CM

## 2024-06-19 DIAGNOSIS — O09.892 SHORT INTERVAL BETWEEN PREGNANCIES AFFECTING PREGNANCY IN SECOND TRIMESTER, ANTEPARTUM: ICD-10-CM

## 2024-06-19 DIAGNOSIS — R10.2 PELVIC PAIN IN PREGNANCY: ICD-10-CM

## 2024-06-19 DIAGNOSIS — E66.01 SEVERE OBESITY DUE TO EXCESS CALORIES AFFECTING PREGNANCY IN SECOND TRIMESTER (HCC): ICD-10-CM

## 2024-06-19 DIAGNOSIS — Z36.89 ENCOUNTER FOR OTHER SPECIFIED ANTENATAL SCREENING: ICD-10-CM

## 2024-06-19 DIAGNOSIS — B37.31 VAGINAL YEAST INFECTION: ICD-10-CM

## 2024-06-19 DIAGNOSIS — O26.899 PELVIC PAIN IN PREGNANCY: ICD-10-CM

## 2024-06-19 LAB
CLUE CELLS SPEC QL WET PREP: NEGATIVE
PH SMN: 3.5 [PH]
SL AMB  POCT GLUCOSE, UA: NEGATIVE
SL AMB POCT URINE PROTEIN: NEGATIVE
T VAGINALIS VAG QL WET PREP: NEGATIVE
YEAST VAG QL WET PREP: POSITIVE

## 2024-06-19 PROCEDURE — 81002 URINALYSIS NONAUTO W/O SCOPE: CPT | Performed by: NURSE PRACTITIONER

## 2024-06-19 PROCEDURE — 87210 SMEAR WET MOUNT SALINE/INK: CPT | Performed by: NURSE PRACTITIONER

## 2024-06-19 PROCEDURE — 99214 OFFICE O/P EST MOD 30 MIN: CPT | Performed by: NURSE PRACTITIONER

## 2024-06-19 NOTE — ASSESSMENT & PLAN NOTE
Zyrtec 1 tab po daily or  Benadryl 25-50 mg po q 6 hours prn itching  Emollient cream aveeno eczema balm  See PCP to evaluate

## 2024-06-19 NOTE — TELEPHONE ENCOUNTER
"Pt called. Reports \"heat rash\" for a couple of days that continues to spread. Pt reports rash is intact, red, and itchy. Pt denies fever. Denies use of new detergents, soaps or toiletries.     Pt is not established with PCP at the office.   Pt reports she no longer sees her PCP and has been planning to switch to [facility]. New patient establishment appointment scheduled for Pt. Advised Pt to go to Urgent Care today to have rash evaluated. Pt is agreeable and denies further questions at this time.   "

## 2024-06-19 NOTE — PROGRESS NOTES
Routine Prenatal Visit  [facility] - [place] [address] [place] [address]    Assessment/Plan:  Janice is a 18 y.o. year old  at 24w6d who presents for routine prenatal visit.     1. 24 weeks gestation of pregnancy  -     POCT urine dip  -     POCT wet mount  2. Encounter for other specified  screening  -     Glucose, 1H PG; Future  -     CBC; Future  -     RPR, Rfx Qn RPR/Confirm TP; Future  -     Glucose, 1H PG  -     CBC  -     RPR, Rfx Qn RPR/Confirm TP  3. Short interval between pregnancies affecting pregnancy in second trimester, antepartum  4. Severe obesity due to excess calories affecting pregnancy in second trimester (HCC)  Assessment & Plan:  Growth ultrasound at 32 weeks  5. Pelvic pain in pregnancy  6. Rash  Assessment & Plan:  Zyrtec 1 tab po daily or  Benadryl 25-50 mg po q 6 hours prn itching  Emollient cream aveeno eczema balm  See PCP to evaluate  7. Vaginal yeast infection  -     terconazole (TERAZOL 7) 0.4 % vaginal cream; Insert 1 applicator into the vagina daily at bedtime for 7 days 1 applicator full vaginally at night for 7 nights.    Call with worsening rash or new symptoms  Pt to schedule with PCP to evaluate  Third trimester labs ordered, discussed timing  Next OB Visit 4 weeks.    Subjective:     CC: Prenatal care    Janice Wallace is a 18 y.o.  female who presents for routine prenatal care at 24w6d. C/O vaginal discharge, greenish in color, no itching or odor. Also c/o rash all over x 1 week, itchy. Never had this before, otherwise feels well. Denies any additional symptoms, no one sick at home. Denies changes in soap, detergents, lotions. Hasn't tried anything to treat.  Pregnancy ROS: no leakage of fluid, pelvic pain, or vaginal bleeding.  normal fetal movement.    The following portions of the patient's history were reviewed and updated as appropriate: allergies, current medications, past family history, past medical history,  obstetric history, gynecologic history, past social history, past surgical history and problem list.      Objective:  /50   Wt 85.1 kg (187 lb 9.6 oz)   LMP 2023   BMI 33.23 kg/m²   Pregravid Weight/BMI: Pregravid weight not on file (BMI Could not be calculated)  Current Weight: 85.1 kg (187 lb 9.6 oz)   Total Weight Gain: Not found.   Pre-Christina Vitals      Flowsheet Row Most Recent Value   Prenatal Assessment    Fetal Heart Rate 150   Fundal Height (cm) 24 cm   Movement Present   Prenatal Vitals    Blood Pressure 110/50   Weight - Scale 85.1 kg (187 lb 9.6 oz)   Urine Albumin/Glucose    Dilation/Effacement/Station    Vaginal Drainage    Draining Fluid No   Edema    LLE Edema None   RLE Edema None   Facial Edema None             General: Well appearing, no distress  Abdomen: Soft, gravid, nontender  Extremities: Non tender.  Fine papular rash over extremities, trunk.  Flesh colored, no erythema. No rash on face.   Pelvic: Normal appearing vulva, no erythema or lesions. Large amount curdy white discharge noted, no lesions. Cervix appears closed and thick, non-friable.   Wet mount: Budding hyphae noted, no trich, no clue, occasional wbc.

## 2024-06-20 ENCOUNTER — TELEPHONE (OUTPATIENT)
Age: 19
End: 2024-06-20

## 2024-06-20 NOTE — TELEPHONE ENCOUNTER
Patient was seen at  yesterday told she has a yeast infection and something would be called in. I see that its in the chart but it says it has not reached it destination yet. Patient is very uncomfortable.

## 2024-06-21 ENCOUNTER — TELEPHONE (OUTPATIENT)
Age: 19
End: 2024-06-21

## 2024-06-21 DIAGNOSIS — Z3A.24 24 WEEKS GESTATION OF PREGNANCY: Primary | ICD-10-CM

## 2024-06-21 RX ORDER — CHOLECALCIFEROL (VITAMIN D3) 25 MCG
.8-2 TABLET,CHEWABLE ORAL DAILY
Qty: 30 CAPSULE | Refills: 11 | Status: SHIPPED | OUTPATIENT
Start: 2024-06-21 | End: 2024-06-26

## 2024-06-21 NOTE — TELEPHONE ENCOUNTER
[facility] pharmacy called to report that terconazole requires prior auth    Reason for call:   [x] Prior Auth  [] Other:     Caller:  [] Patient  [x] Pharmacy  [id number] - [address]  :     Medication: terconazole vaginal cream    Dose/Frequency: 0.4% qhs for 7 nights    Quantity: 35g    Ordering Provider:   [x] Speciality/Provider - ashley / Evangelista

## 2024-06-21 NOTE — TELEPHONE ENCOUNTER
Pt called in stating the the medication still isnt available for  due to a prior auth needed. Pt requesting something for relief since she is still experiencing yeast infection symptoms

## 2024-06-24 ENCOUNTER — NURSE TRIAGE (OUTPATIENT)
Age: 19
End: 2024-06-24

## 2024-06-24 DIAGNOSIS — R21 RASH: Primary | ICD-10-CM

## 2024-06-24 RX ORDER — METHYLPREDNISOLONE 4 MG/1
TABLET ORAL
Qty: 1 EACH | Refills: 0 | Status: SHIPPED | OUTPATIENT
Start: 2024-06-24

## 2024-06-24 NOTE — TELEPHONE ENCOUNTER
"Patient 25 weeks 4 days pregnant , called c/o widespread rash that started 2 weeks ago. The rash is from her toes to her neck, covering her body and consists of red bumps that are a little bigger than the tip of a pen. The rash itches and has gotten progressively worse over the last 2 weeks. She was seen in the office for a visit on 6/19 and advised to take Zyrtec 1 tab po daily or  Benadryl 25-50 mg po q 6 hours prn itching,   Emollient cream and aveeno eczema balm. She has tried these and none have provided relief. She attempted to schedule an appointment with er PCP, however she cannot get an appointment until July. She attempted to go to ED but waited a very long time and left. Patient would like to know if there is something else she should do or take at this time. Routing to provider for further advice.           Reason for Disposition   Pregnant    Answer Assessment - Initial Assessment Questions  1. APPEARANCE of RASH: \"Describe the rash.\" (e.g., spots, blisters, raised areas, skin peeling, scaly)      Small red itchy bumps   2. SIZE: \"How big are the spots?\" (e.g., tip of pen, eraser, coin; inches, centimeters)      Bumps are a little bigger than the tip of a pen.   3. LOCATION: \"Where is the rash located?\"      \"Everywhere\" from toes to neck.   4. COLOR: \"What color is the rash?\" (Note: It is difficult to assess rash color in people with darker-colored skin. When this situation occurs, simply ask the caller to describe what they see.)      Red sometimes.   5. ONSET: \"When did the rash begin?\"      2 weeks ago   6. FEVER: \"Do you have a fever?\" If Yes, ask: \"What is your temperature, how was it measured, and when did it start?\"      No   7. ITCHING: \"Does the rash itch?\" If Yes, ask: \"How bad is the itch?\" (Scale 1-10; or mild, moderate, severe)      9  8. CAUSE: \"What do you think is causing the rash?\"      Unknown.    9. MEDICATION FACTORS: \"Have you started any new medications within the last 2 weeks?\" " "(e.g., antibiotics)       No   10. OTHER SYMPTOMS: \"Do you have any other symptoms?\" (e.g., dizziness, headache, sore throat, joint pain)        No   11. PREGNANCY: \"Is there any chance you are pregnant?\" \"When was your last menstrual period?\"        25 weeks 4 days pregnant. CHRISTOPHER: 10/3/24    Protocols used: Rash or Redness - Widespread-ADULT-[place]    "

## 2024-06-24 NOTE — TELEPHONE ENCOUNTER
Called patient back, advised her per Sandra Naidu Medrol dose pack has been ordered, to start this as soon as possible and Syphillis testing has been ordered. Have testing done as soon as possible, can have done at [facility]/ She verbalized understanding.

## 2024-06-24 NOTE — TELEPHONE ENCOUNTER
Left message for pt, medication I ordered is awaiting prior authorization from insurance. She can treat with Monistat 7 as an alternative. She is to call back with any additional questions or concerns.

## 2024-06-26 ENCOUNTER — TELEPHONE (OUTPATIENT)
Age: 19
End: 2024-06-26

## 2024-06-26 DIAGNOSIS — Z3A.24 24 WEEKS GESTATION OF PREGNANCY: Primary | ICD-10-CM

## 2024-06-26 RX ORDER — PNV NO.52/IRON/FA/OMEGA-3/DHA 29-1-200MG
1 COMBINATION PACKAGE (EA) ORAL DAILY
Qty: 90 EACH | Refills: 2 | Status: SHIPPED | OUTPATIENT
Start: 2024-06-26 | End: 2024-09-24

## 2024-06-26 NOTE — TELEPHONE ENCOUNTER
PA for Prenatal Vit-Fe Sulfate-FA-DHA (Prenatal Vitamin/Min +DHA) 27-0.8-200 MG CAPS  Denied    Reason:(Screenshot if applicable)        Message sent to office clinical pool Yes    Denial letter scanned into Media No      Appeal started No ( Provider will need to decide if appeal is warranted and send clinical documentation to PA team for initiation.)    **Please follow up with your patient regarding denial and next steps**

## 2024-07-08 ENCOUNTER — OFFICE VISIT (OUTPATIENT)
Dept: FAMILY MEDICINE CLINIC | Facility: HOSPITAL | Age: 19
End: 2024-07-08
Payer: COMMERCIAL

## 2024-07-08 VITALS
DIASTOLIC BLOOD PRESSURE: 68 MMHG | HEART RATE: 102 BPM | BODY MASS INDEX: 33.13 KG/M2 | WEIGHT: 187 LBS | SYSTOLIC BLOOD PRESSURE: 124 MMHG | HEIGHT: 63 IN | OXYGEN SATURATION: 98 % | TEMPERATURE: 98.5 F

## 2024-07-08 DIAGNOSIS — Z76.89 ESTABLISHING CARE WITH NEW DOCTOR, ENCOUNTER FOR: Primary | ICD-10-CM

## 2024-07-08 DIAGNOSIS — L25.9 CONTACT DERMATITIS, UNSPECIFIED CONTACT DERMATITIS TYPE, UNSPECIFIED TRIGGER: ICD-10-CM

## 2024-07-08 DIAGNOSIS — Z3A.27 27 WEEKS GESTATION OF PREGNANCY: ICD-10-CM

## 2024-07-08 PROCEDURE — 99203 OFFICE O/P NEW LOW 30 MIN: CPT | Performed by: NURSE PRACTITIONER

## 2024-07-08 RX ORDER — FAMOTIDINE 20 MG/1
20 TABLET, FILM COATED ORAL DAILY
Qty: 30 TABLET | Refills: 0 | Status: SHIPPED | OUTPATIENT
Start: 2024-07-08

## 2024-07-08 RX ORDER — CETIRIZINE HYDROCHLORIDE 5 MG/1
5 TABLET, CHEWABLE ORAL DAILY
Qty: 30 TABLET | Refills: 0 | Status: SHIPPED | OUTPATIENT
Start: 2024-07-08 | End: 2024-07-11 | Stop reason: ALTCHOICE

## 2024-07-08 NOTE — PROGRESS NOTES
[facility]   Shalonda PAUL    Assessment/Plan:   1. Establishing care with new doctor, encounter for  2. 27 weeks gestation of pregnancy  3. Contact dermatitis, unspecified contact dermatitis type, unspecified trigger  -     cetirizine (ZyrTEC) 5 MG chewable tablet; Chew 1 tablet (5 mg total) daily  -     famotidine (PEPCID) 20 mg tablet; Take 1 tablet (20 mg total) by mouth daily      Recommend zyrtec and pepcid daily for the next month.    Return in about 4 months (around 11/8/2024) for Annual physical.  Patient may call or return to office with any questions or concerns.     ______________________________________________________________________  Subjective:     Patient ID: Janice Wallace is a 18 y.o. female.  Janice Wallace  Chief Complaint   Patient presents with    Establish Care     Pt went to the [place] about a month ago - now has red bumps on feet and thighs - itchy.  Pt is using Benadryl and a cream that her OB-GYN prescribed.      Here to establish care.  Has not had a PCP in a few years.      PMH:  none  Shx:  none  FHx: DM2, HTN    Diet:  well balance.  No alcohol nor cigarettes  2yr old daughter and one on the way.    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    Sedentary lifestyle    Went to [place] to visit her mom and thinks she got a rash while there which is slowly resolving.  Using Benadryl 25mg at HS to help with pruritus at night.  Was given medrol dose pack by GYN but pharmacy did not fill for her due to pregancy?     Rash persistent on inner thighs/BUE.  Is using Eucerin daily  No allergies to foods, medications, lotions, perfumes, laundry detergents.  Has not changed her typical routine (no new hygienic items, foods, meds, etc)                 The following portions of the patient's history were reviewed and updated as appropriate: allergies, current medications, past family history, past medical history, past social history,  past surgical history, and problem list.    Review of Systems   Constitutional: Negative.  Negative for activity change, appetite change, chills, fatigue and fever.   HENT: Negative.  Negative for congestion, ear pain, postnasal drip and sinus pain.    Eyes: Negative.    Respiratory: Negative.  Negative for cough and shortness of breath.    Cardiovascular: Negative.  Negative for chest pain and leg swelling.   Gastrointestinal: Negative.  Negative for constipation and diarrhea.   Endocrine: Negative.    Genitourinary: Negative.  Negative for dysuria.   Musculoskeletal: Negative.    Skin:  Positive for rash.   Allergic/Immunologic: Negative.  Negative for immunocompromised state.   Neurological: Negative.  Negative for dizziness and light-headedness.   Hematological: Negative.    Psychiatric/Behavioral: Negative.           Objective:      Vitals:    07/08/24 1544   BP: 124/68   Pulse: 102   Temp: 98.5 °F (36.9 °C)   SpO2: 98%      Physical Exam  Vitals and nursing note reviewed.   Constitutional:       General: She is awake.      Appearance: Normal appearance.   HENT:      Head: Normocephalic and atraumatic.      Right Ear: Tympanic membrane, ear canal and external ear normal.      Left Ear: Tympanic membrane, ear canal and external ear normal.      Nose: Nose normal.      Mouth/Throat:      Mouth: Mucous membranes are moist.      Pharynx: Oropharynx is clear.   Eyes:      Extraocular Movements: Extraocular movements intact.      Conjunctiva/sclera: Conjunctivae normal.      Pupils: Pupils are equal, round, and reactive to light.   Cardiovascular:      Rate and Rhythm: Normal rate and regular rhythm.      Pulses: Normal pulses.      Heart sounds: Normal heart sounds.   Pulmonary:      Effort: Pulmonary effort is normal.      Breath sounds: Normal breath sounds.   Abdominal:      General: Bowel sounds are normal.      Palpations: Abdomen is soft.   Musculoskeletal:         General: Normal range of motion.       "Cervical back: Normal range of motion and neck supple.   Skin:     General: Skin is warm and dry.      Findings: Rash present. No erythema. Rash is papular and purpuric. Rash is not pustular or vesicular.      Comments: Persistent pruritic rash inner thighs.  Resolving rash BUE.     Neurological:      General: No focal deficit present.      Mental Status: She is alert and oriented to person, place, and time.   Psychiatric:         Mood and Affect: Mood normal.         Behavior: Behavior normal. Behavior is cooperative.         Thought Content: Thought content normal.         Judgment: Judgment normal.           Portions of the record may have been created with voice recognition software. Occasional wrong word or \"sound alike\" substitutions may have occurred due to the inherent limitations of voice recognition software. Please review the chart carefully and recognize, using context, where substitutions/typographical errors may have occurred.       "

## 2024-07-09 ENCOUNTER — TELEPHONE (OUTPATIENT)
Age: 19
End: 2024-07-09

## 2024-07-09 NOTE — TELEPHONE ENCOUNTER
"Insurance company called seeking more information about medication - states patient's plan does require that she try and fail 2 \"preferred\" alternatives or have clinical documentation of why she cannot try these. Will fax documents to office for provider review and will await further information. Fax number confirmed. Please review and advise.  "

## 2024-07-09 NOTE — TELEPHONE ENCOUNTER
PA for cetirizine (ZyrTEC) 5 MG chewable tablet     Submitted via    [][email or web address]    [x]Will-Case ID #   [id number]   []Faxed to plan   []Other website    []Phone call Case ID #      Office notes sent, clinical questions answered. Awaiting determination    Turnaround time for your insurance to make a decision on your Prior Authorization can take 7-21 business days.

## 2024-07-10 NOTE — TELEPHONE ENCOUNTER
Open up media and there is a list of medication on the formulary - I don't know if you would like to prescribe one of these or did you want to appeal for the [facility] chewables

## 2024-07-11 DIAGNOSIS — R21 RASH: Primary | ICD-10-CM

## 2024-07-11 RX ORDER — CETIRIZINE HYDROCHLORIDE 5 MG/1
5 TABLET ORAL DAILY
Qty: 30 TABLET | Refills: 0 | Status: SHIPPED | OUTPATIENT
Start: 2024-07-11

## 2024-07-13 LAB
EXTERNAL HEMOGLOBIN: 10.2 G/DL
EXTERNAL PLATELET COUNT: 300 K/ΜL
EXTERNAL SYPHILIS TOTAL IGG/IGM SCREENING: NORMAL

## 2024-07-14 LAB
ERYTHROCYTE [DISTWIDTH] IN BLOOD BY AUTOMATED COUNT: 13.1 % (ref 11.7–15.4)
GLUCOSE 1H P 50 G GLC PO SERPL-MCNC: 112 MG/DL (ref 70–139)
HCT VFR BLD AUTO: 32.9 % (ref 34–46.6)
HGB BLD-MCNC: 10.2 G/DL (ref 11.1–15.9)
MCH RBC QN AUTO: 24.2 PG (ref 26.6–33)
MCHC RBC AUTO-ENTMCNC: 31 G/DL (ref 31.5–35.7)
MCV RBC AUTO: 78 FL (ref 79–97)
PLATELET # BLD AUTO: 300 X10E3/UL (ref 150–450)
RBC # BLD AUTO: 4.21 X10E6/UL (ref 3.77–5.28)
RPR SER QL: NON REACTIVE
WBC # BLD AUTO: 10.3 X10E3/UL (ref 3.4–10.8)

## 2024-07-15 PROBLEM — O99.013 ANEMIA AFFECTING PREGNANCY IN THIRD TRIMESTER: Status: ACTIVE | Noted: 2024-07-15

## 2024-07-17 ENCOUNTER — ROUTINE PRENATAL (OUTPATIENT)
Dept: OBGYN CLINIC | Facility: CLINIC | Age: 19
End: 2024-07-17
Payer: COMMERCIAL

## 2024-07-17 VITALS
SYSTOLIC BLOOD PRESSURE: 120 MMHG | BODY MASS INDEX: 33.66 KG/M2 | DIASTOLIC BLOOD PRESSURE: 64 MMHG | HEIGHT: 63 IN | WEIGHT: 190 LBS

## 2024-07-17 DIAGNOSIS — O99.013 ANEMIA AFFECTING PREGNANCY IN THIRD TRIMESTER: ICD-10-CM

## 2024-07-17 DIAGNOSIS — Z3A.28 28 WEEKS GESTATION OF PREGNANCY: ICD-10-CM

## 2024-07-17 DIAGNOSIS — R10.2 PELVIC PAIN IN PREGNANCY: ICD-10-CM

## 2024-07-17 DIAGNOSIS — R21 RASH: ICD-10-CM

## 2024-07-17 DIAGNOSIS — O99.212 SEVERE OBESITY DUE TO EXCESS CALORIES AFFECTING PREGNANCY IN SECOND TRIMESTER (HCC): ICD-10-CM

## 2024-07-17 DIAGNOSIS — O09.892 SHORT INTERVAL BETWEEN PREGNANCIES AFFECTING PREGNANCY IN SECOND TRIMESTER, ANTEPARTUM: Primary | ICD-10-CM

## 2024-07-17 DIAGNOSIS — O26.899 PELVIC PAIN IN PREGNANCY: ICD-10-CM

## 2024-07-17 DIAGNOSIS — E66.01 SEVERE OBESITY DUE TO EXCESS CALORIES AFFECTING PREGNANCY IN SECOND TRIMESTER (HCC): ICD-10-CM

## 2024-07-17 LAB
SL AMB  POCT GLUCOSE, UA: NORMAL
SL AMB POCT URINE PROTEIN: NORMAL

## 2024-07-17 PROCEDURE — 81002 URINALYSIS NONAUTO W/O SCOPE: CPT | Performed by: NURSE PRACTITIONER

## 2024-07-17 PROCEDURE — 99213 OFFICE O/P EST LOW 20 MIN: CPT | Performed by: NURSE PRACTITIONER

## 2024-07-17 NOTE — PROGRESS NOTES
"Routine Prenatal Visit  [facility] - [place]  [address], [place] [address]    Assessment/Plan:  Janice is a 18 y.o. year old  at 28w6d who presents for routine prenatal visit.     1. Short interval between pregnancies affecting pregnancy in second trimester, antepartum  2. Severe obesity due to excess calories affecting pregnancy in second trimester (HCC)  Assessment & Plan:  Growth US at 32 weeks, scheduled  3. Pelvic pain in pregnancy  4. 28 weeks gestation of pregnancy  -     POCT urine dip  5. Anemia affecting pregnancy in third trimester  Assessment & Plan:  Taking iron once daily, iron rich foods  6. Rash  Assessment & Plan:  Resolved    Recurrent yeast, will treat with Monistat 7  Reviewed [facility], PTL precautions  Call with any concerns  Next OB Visit 2 weeks.    Subjective:     CC: Prenatal care    Janice Wallace is a 18 y.o.  female who presents for routine prenatal care at 28w6d. Rash improving, never took steroids. Taking Zyrtec and Pepcid but improving before she started taking.C/O vaginal itching, treated a few weeks ago for yeast, confirmed at that time.  Symptoms resolved with Monistat 7 but are back again.   Pregnancy ROS: no leakage of fluid, pelvic pain, or vaginal bleeding.  normal fetal movement.    The following portions of the patient's history were reviewed and updated as appropriate: allergies, current medications, past family history, past medical history, obstetric history, gynecologic history, past social history, past surgical history and problem list.      Objective:  /64 (BP Location: Left arm, Patient Position: Sitting, Cuff Size: Standard)   Ht 5' 3\" (1.6 m)   Wt 86.2 kg (190 lb)   LMP 2023   BMI 33.66 kg/m²   Pregravid Weight/BMI: Pregravid weight not on file (BMI Could not be calculated)  Current Weight: 86.2 kg (190 lb)   Total Weight Gain: Not found.   Pre-Christina Vitals      Flowsheet Row Most Recent Value   Prenatal " Assessment    Fetal Heart Rate 152   Fundal Height (cm) 28 cm   Movement Present   Prenatal Vitals    Blood Pressure 120/64   Weight - Scale 86.2 kg (190 lb)   Urine Albumin/Glucose    Dilation/Effacement/Station    Vaginal Drainage    Draining Fluid No   Edema    LLE Edema None   RLE Edema None   Facial Edema None             General: Well appearing, no distress  Abdomen: Soft, gravid, nontender  Extremities: Non tender.

## 2024-07-24 ENCOUNTER — TELEPHONE (OUTPATIENT)
Age: 19
End: 2024-07-24

## 2024-07-24 NOTE — TELEPHONE ENCOUNTER
Message left to patient, no forms received and to contact her HR Department and have the form re faxed to [phone].

## 2024-07-24 NOTE — TELEPHONE ENCOUNTER
Patient called in regards to her FMLA, not scanned into chart yet, warm transferred to the office and was told by Peggy she will continue to look into it and give the patient a call back. Patient verbalized understanding. Please notify patient when received.

## 2024-07-25 ENCOUNTER — PATIENT OUTREACH (OUTPATIENT)
Dept: OBGYN CLINIC | Facility: CLINIC | Age: 19
End: 2024-07-25

## 2024-07-25 ENCOUNTER — TELEPHONE (OUTPATIENT)
Dept: OBGYN CLINIC | Facility: CLINIC | Age: 19
End: 2024-07-25

## 2024-07-25 DIAGNOSIS — O09.892 SHORT INTERVAL BETWEEN PREGNANCIES AFFECTING PREGNANCY IN SECOND TRIMESTER, ANTEPARTUM: ICD-10-CM

## 2024-07-25 DIAGNOSIS — R45.89 FEELING OF SADNESS: ICD-10-CM

## 2024-07-25 DIAGNOSIS — R10.2 PELVIC PAIN IN PREGNANCY: ICD-10-CM

## 2024-07-25 DIAGNOSIS — Z3A.19 19 WEEKS GESTATION OF PREGNANCY: Primary | ICD-10-CM

## 2024-07-25 DIAGNOSIS — O99.212 SEVERE OBESITY DUE TO EXCESS CALORIES AFFECTING PREGNANCY IN SECOND TRIMESTER (HCC): ICD-10-CM

## 2024-07-25 DIAGNOSIS — O26.899 PELVIC PAIN IN PREGNANCY: ICD-10-CM

## 2024-07-25 DIAGNOSIS — E66.01 SEVERE OBESITY DUE TO EXCESS CALORIES AFFECTING PREGNANCY IN SECOND TRIMESTER (HCC): ICD-10-CM

## 2024-07-25 NOTE — PROGRESS NOTES
Patient reported to OB Navigator Floyd today that she is experiencing increased sadness/feelings of being overwhelmed. Depression score is an 8. Patient requested to speak with EDDIE Robertson about starting medication at their next visit. EDDIE Robertson requested that DANA also reach out to patient to inquire about interest in therapy services.    DANA called patient to check in. She confirms that things have been a little harder this pregnancy, especially since her father recently moved to another state. She denies SI/HI. She is interested in therapy. Agreeable to DANA sending her information for MA therapists in [facility]. DANA sent via [employer]. DANA to f/u next week regarding options.

## 2024-07-25 NOTE — TELEPHONE ENCOUNTER
"Third Trimester call  @ 30W0D.   Overall how are you feeling?   Reports she is feeling good and baby moving well.   See Notes EPDS -  Compliant with routine OB appointments? yes    Have you completed your 3rd trimester lab work? Yes,  7/15/24-CBC showed Slightly anemic, Started on OTC Iron as recommended  Have you reviewed the contents of 3rd trimester folder from office?    Have you decided on a pediatrician? Yes,     If yes, who [facility]     If no, what are you looking for and request sent for outreach.   Questions on paperwork to go back to office? Discussed   Questions on the baby birth certificate forms? Discussed     EPDS Scrore 8  Expressed she is sometimes feeling overwhelmed, emotional and unhappy with this pregnancy. Denies any thoughts of harm to herself or others. Has an OB appointment scheduled on 7/29/24. Would like to discuss option to starting on medication. Offered a sooner appointment, pt declined and stated, with her work schedule, she is \"feels fine\" to wait until her appointment.   Pt is scheduled with Ailyn MORGAN on 7/29/24-Forwarded to provider to review.     Sent link for the Hospital Readiness Video via [employer]  "

## 2024-07-25 NOTE — TELEPHONE ENCOUNTER
Adria Barrientos,  Thanks for the info.  Can you have  felipe screen her and do a referral for counseling.  Thanks

## 2024-07-29 ENCOUNTER — ROUTINE PRENATAL (OUTPATIENT)
Dept: OBGYN CLINIC | Facility: CLINIC | Age: 19
End: 2024-07-29
Payer: COMMERCIAL

## 2024-07-29 VITALS
SYSTOLIC BLOOD PRESSURE: 114 MMHG | HEIGHT: 63 IN | BODY MASS INDEX: 34.16 KG/M2 | WEIGHT: 192.8 LBS | DIASTOLIC BLOOD PRESSURE: 58 MMHG

## 2024-07-29 DIAGNOSIS — O99.213 SEVERE OBESITY DUE TO EXCESS CALORIES AFFECTING PREGNANCY IN THIRD TRIMESTER (HCC): ICD-10-CM

## 2024-07-29 DIAGNOSIS — F32.A DEPRESSION DURING PREGNANCY IN THIRD TRIMESTER: ICD-10-CM

## 2024-07-29 DIAGNOSIS — R10.2 PELVIC PAIN IN PREGNANCY: ICD-10-CM

## 2024-07-29 DIAGNOSIS — O09.892 SHORT INTERVAL BETWEEN PREGNANCIES AFFECTING PREGNANCY IN SECOND TRIMESTER, ANTEPARTUM: ICD-10-CM

## 2024-07-29 DIAGNOSIS — O26.899 PELVIC PAIN IN PREGNANCY: ICD-10-CM

## 2024-07-29 DIAGNOSIS — O99.343 DEPRESSION DURING PREGNANCY IN THIRD TRIMESTER: ICD-10-CM

## 2024-07-29 DIAGNOSIS — E66.01 SEVERE OBESITY DUE TO EXCESS CALORIES AFFECTING PREGNANCY IN THIRD TRIMESTER (HCC): ICD-10-CM

## 2024-07-29 DIAGNOSIS — Z3A.30 30 WEEKS GESTATION OF PREGNANCY: Primary | ICD-10-CM

## 2024-07-29 LAB
SL AMB  POCT GLUCOSE, UA: NORMAL
SL AMB POCT URINE PROTEIN: NORMAL

## 2024-07-29 PROCEDURE — 99213 OFFICE O/P EST LOW 20 MIN: CPT | Performed by: OBSTETRICS & GYNECOLOGY

## 2024-07-29 PROCEDURE — 81002 URINALYSIS NONAUTO W/O SCOPE: CPT | Performed by: OBSTETRICS & GYNECOLOGY

## 2024-07-29 RX ORDER — PROGESTERONE 200 MG/1
1 CAPSULE ORAL EVERY 12 HOURS
COMMUNITY

## 2024-07-29 NOTE — PROGRESS NOTES
"Routine Prenatal Visit  [facility] - [address], [address], [place] [address]    Assessment/Plan:  Janice is a 18 y.o. year old  at 30w4d who presents for routine prenatal visit.     1. 30 weeks gestation of pregnancy  -     POCT urine dip  2. Short interval between pregnancies affecting pregnancy in second trimester, antepartum  3. Severe obesity due to excess calories affecting pregnancy in third trimester (HCC)  4. Pelvic pain in pregnancy  5. Depression during pregnancy in third trimester  -     sertraline (Zoloft) 50 mg tablet; Take 1 tablet (50 mg total) by mouth daily    + feeling  down has never used meds  in past.  No feelings of hurting self or others.  Sleep is poor.  Declines  counseling at this time.  Has 13 mo daughter  + support system.  To  start  Zoloft  50 mg daily.  Co of  HA   BP  wnl and   no protein in urine.   Fu in 2 weeks     Subjective:     CC: Prenatal care    Janice Wallace is a 18 y.o.  female who presents for routine prenatal care at 30w4d.  Pregnancy ROS: no  leakage of fluid, pelvic pain, or vaginal bleeding.  +  fetal movement.    The following portions of the patient's history were reviewed and updated as appropriate: allergies, current medications, past family history, past medical history, obstetric history, gynecologic history, past social history, past surgical history and problem list.      Objective:  /58 (BP Location: Left arm, Patient Position: Sitting, Cuff Size: Standard)   Ht 5' 3\" (1.6 m)   Wt 87.5 kg (192 lb 12.8 oz)   LMP 2023   BMI 34.15 kg/m²   Pregravid Weight/BMI: Pregravid weight not on file (BMI Could not be calculated)  Current Weight: 87.5 kg (192 lb 12.8 oz)   Total Weight Gain: Not found.   Pre- Vitals    Flowsheet Row Most Recent Value   Prenatal Assessment    Fetal Heart Rate 146   Fundal Height (cm) 31 cm   Movement Present   Prenatal Vitals    Blood Pressure 114/58   Weight - " Scale 87.5 kg (192 lb 12.8 oz)   Urine Albumin/Glucose    Dilation/Effacement/Station    Vaginal Drainage    Draining Fluid No   Edema    LLE Edema None   RLE Edema None   Facial Edema None           General: Well appearing, no distress  Respiratory: Unlabored breathing  Cardiovascular: Regular rate.  Abdomen: Soft, gravid, nontender  Fundal Height: Appropriate for gestational age.  Extremities: Warm and well perfused.  Non tender.

## 2024-08-01 ENCOUNTER — PATIENT OUTREACH (OUTPATIENT)
Dept: OBGYN CLINIC | Facility: CLINIC | Age: 19
End: 2024-08-01

## 2024-08-01 NOTE — PROGRESS NOTES
SW called patient to check in progress with therapy list. Patient confirmed she received email, but has not looking through providers in depth. SW to f/u with patient in two weeks to discuss progress.

## 2024-08-03 NOTE — PROGRESS NOTES
Please refer to the [facility] ultrasound report in Ob Procedures for additional information regarding today's visit

## 2024-08-07 ENCOUNTER — ULTRASOUND (OUTPATIENT)
Dept: PERINATAL CARE | Facility: OTHER | Age: 19
End: 2024-08-07
Payer: COMMERCIAL

## 2024-08-07 VITALS
BODY MASS INDEX: 33.56 KG/M2 | HEART RATE: 107 BPM | WEIGHT: 189.4 LBS | DIASTOLIC BLOOD PRESSURE: 62 MMHG | HEIGHT: 63 IN | SYSTOLIC BLOOD PRESSURE: 122 MMHG

## 2024-08-07 DIAGNOSIS — O99.343 DEPRESSION COMPLICATING PREGNANCY, ANTEPARTUM, THIRD TRIMESTER: ICD-10-CM

## 2024-08-07 DIAGNOSIS — F32.A DEPRESSION COMPLICATING PREGNANCY, ANTEPARTUM, THIRD TRIMESTER: ICD-10-CM

## 2024-08-07 DIAGNOSIS — Z36.4 ULTRASOUND FOR ANTENATAL SCREENING FOR FETAL GROWTH RESTRICTION: ICD-10-CM

## 2024-08-07 DIAGNOSIS — Z3A.31 31 WEEKS GESTATION OF PREGNANCY: Primary | ICD-10-CM

## 2024-08-07 DIAGNOSIS — O09.893 SHORT INTERVAL BETWEEN PREGNANCIES COMPLICATING PREGNANCY, ANTEPARTUM, THIRD TRIMESTER: ICD-10-CM

## 2024-08-07 PROCEDURE — 76816 OB US FOLLOW-UP PER FETUS: CPT | Performed by: OBSTETRICS & GYNECOLOGY

## 2024-08-07 PROCEDURE — 99213 OFFICE O/P EST LOW 20 MIN: CPT | Performed by: OBSTETRICS & GYNECOLOGY

## 2024-08-07 NOTE — LETTER
August 7, 2024     Irving Joya V,   [address]  [address]  [place] [address]    Patient: Janice Wallace   YOB: 2005   Date of Visit: 8/7/2024       Dear Dr. Joya:    Thank you for referring Janice Wallace to me for evaluation. Below are my notes for this consultation.    If you have questions, please do not hesitate to call me. I look forward to following your patient along with you.         Sincerely,        Mil Malloy MD        CC: No Recipients    Mil Malloy MD  8/7/2024  2:15 PM  Sign when Signing Visit  Please refer to the [facility] ultrasound report in Ob Procedures for additional information regarding today's visit

## 2024-08-07 NOTE — PATIENT INSTRUCTIONS
"Patient Education     Your baby's movement before birth   The Basics   Written by the doctors and editors at [facility]   When should I start feeling my baby move? -- It depends. Most people first feel their baby moving in the uterus between about 16 and 20 weeks of pregnancy. It might take longer to feel movement if this is your first pregnancy or if the placenta is in the front of your uterus.  What kinds of movements should I feel? -- When you first feel your baby move, it might feel like a gentle flutter in your belly. This is sometimes called \"quickening.\" As the baby grows, their movements will get stronger. You will probably feel them kicking, rolling, and stretching. Later in pregnancy, you might be able to see and feel the baby moving from the outside.  You might notice that your baby is more active at certain times of the day or night. Even before birth, babies have periods of being asleep and awake. When your baby is sleeping, you might notice that they do not move as much.  Should I keep track of my baby's movements? -- If your pregnancy is healthy, you probably do not need to count or record your baby's movements. Feeling regular movement is a good sign that the baby is doing well.  In some cases, your doctor or midwife might ask you to keep track of your baby's movements. If so, they will tell you how to do this and when to call them.  A change in your baby's movements does not always mean that there is a problem. But in some cases, it can be a sign that the baby is having trouble. If your doctor or midwife is concerned, they can do tests to check on the baby.  If I am asked to track movement, how should I do it? -- There are different ways of tracking your baby's movement. This is sometimes called \"kick counting.\"  Your doctor or midwife will tell you exactly what to track. For example, they might ask you to write down:   How long it takes to feel 10 kicks or movements   How many times your baby moves " in 1 hour  Many experts consider at least 10 movements in 2 hours to be a sign that the baby is doing well. But there is no specific cutoff for exactly how much movement is healthy or unhealthy. Some babies are more active than others, and some pregnant people feel movement more easily than others. The main goal of kick counting is to get to know your baby's normal patterns so you can tell if anything changes.  If you are doing kick counting:   Choose a time of day when your baby is usually active.   Find a quiet place where you will not be distracted.   Lie down on your side in a comfortable position.   Check the clock, or set a timer.   Each time you feel your baby move or kick, write down the time. Some people use a smartphone liyah to keep track.   If your baby seems less active than usual, try moving around, eating a snack, and emptying your bladder. This can help wake the baby up if they are asleep.   Stop counting after you have felt 10 kicks, or after the length of time your doctor or midwife told you.  When should I call the doctor? -- Call your doctor or midwife for advice if:   You have concerns about your baby's movement.   Your baby is moving less than they normally do.   You notice a sudden change in the pattern of your baby's movements.   You have any other symptoms that worry you.  All topics are updated as new evidence becomes available and our peer review process is complete.  This topic retrieved from [employer] on: Feb 26, 2024.  Topic 783843 Version 1.0  Release: 32.2.4 - C32.56  © 2024 UpToDate, Inc. and/or its affiliates. All rights reserved.  Consumer Information Use and Disclaimer   Disclaimer: This generalized information is a limited summary of diagnosis, treatment, and/or medication information. It is not meant to be comprehensive and should be used as a tool to help the user understand and/or assess potential diagnostic and treatment options. It does NOT include all information about  conditions, treatments, medications, side effects, or risks that may apply to a specific patient. It is not intended to be medical advice or a substitute for the medical advice, diagnosis, or treatment of a health care provider based on the health care provider's examination and assessment of a patient's specific and unique circumstances. Patients must speak with a health care provider for complete information about their health, medical questions, and treatment options, including any risks or benefits regarding use of medications. This information does not endorse any treatments or medications as safe, effective, or approved for treating a specific patient. UpToDate, Inc. and its affiliates disclaim any warranty or liability relating to this information or the use thereof.The use of this information is governed by the Terms of Use, available at https://www.woltersModustriuwer.com/en/know/clinical-effectiveness-terms. 2024© UpToDate, Inc. and its affiliates and/or licensors. All rights reserved.  Copyright   © 2024 UpToDate, Inc. and/or its affiliates. All rights reserved.

## 2024-08-11 ENCOUNTER — NURSE TRIAGE (OUTPATIENT)
Dept: OTHER | Facility: OTHER | Age: 19
End: 2024-08-11

## 2024-08-11 NOTE — TELEPHONE ENCOUNTER
"Reason for Disposition  • [1] Rash (e.g., redness, tiny bumps, sore) of genital area AND [2] present > 24 hours    Answer Assessment - Initial Assessment Questions  1. SYMPTOM: \"What's the main symptom you're concerned about?\" (e.g., rash, itching, swelling, dryness)      Rash    2. LOCATION: \"Where is the  inside located?\" (e.g., inside/outside, left/right)   Inside      3. ONSET: \"When did the  symptoms  start?\"  This week      4. PAIN: \"Is there any pain?\" If Yes, ask: \"How bad is it?\" (Scale: 1-10; mild, moderate, severe)    -  MILD (1-3): doesn't interfere with normal activities     -  MODERATE (4-7): interferes with normal activities (e.g., work or school) or awakens from sleep      -  SEVERE (8-10): excruciating pain, unable to do any normal activities      No pain, just itchiness    5. CAUSE: \"What do you think is causing the symptoms?\"    Unsure     6. OTHER SYMPTOMS: \"Do you have any other symptoms?\" (e.g., fever, vaginal bleeding, pain with urination)  Vaginal discharge- green      7. CHRISTOPHER: \"What date are you expecting to deliver?\"       10/3/24    8. PREGNANCY: \"How many weeks pregnant are you?\"      32w3d    Protocols used: Pregnancy - Vulvar Symptoms-ADULT-AH    "

## 2024-08-11 NOTE — TELEPHONE ENCOUNTER
"Regardin weeks pregnant/possible infection in vagina/bumps  ----- Message from Gaby TILLMAN sent at 2024  8:58 AM EDT -----  \"I am 32 weeks pregnant and I think I have an infection down there. I feel bumps \"    "

## 2024-08-12 ENCOUNTER — ROUTINE PRENATAL (OUTPATIENT)
Dept: OBGYN CLINIC | Facility: CLINIC | Age: 19
End: 2024-08-12
Payer: COMMERCIAL

## 2024-08-12 VITALS
SYSTOLIC BLOOD PRESSURE: 128 MMHG | BODY MASS INDEX: 33.84 KG/M2 | HEIGHT: 63 IN | DIASTOLIC BLOOD PRESSURE: 70 MMHG | WEIGHT: 191 LBS

## 2024-08-12 DIAGNOSIS — N89.8 VAGINAL DISCHARGE DURING PREGNANCY IN THIRD TRIMESTER: ICD-10-CM

## 2024-08-12 DIAGNOSIS — O09.892 SHORT INTERVAL BETWEEN PREGNANCIES AFFECTING PREGNANCY IN SECOND TRIMESTER, ANTEPARTUM: ICD-10-CM

## 2024-08-12 DIAGNOSIS — Z3A.32 32 WEEKS GESTATION OF PREGNANCY: Primary | ICD-10-CM

## 2024-08-12 DIAGNOSIS — Z23 ENCOUNTER FOR IMMUNIZATION: ICD-10-CM

## 2024-08-12 DIAGNOSIS — O26.893 VAGINAL DISCHARGE DURING PREGNANCY IN THIRD TRIMESTER: ICD-10-CM

## 2024-08-12 DIAGNOSIS — E66.01 SEVERE OBESITY DUE TO EXCESS CALORIES AFFECTING PREGNANCY IN SECOND TRIMESTER (HCC): ICD-10-CM

## 2024-08-12 DIAGNOSIS — O99.212 SEVERE OBESITY DUE TO EXCESS CALORIES AFFECTING PREGNANCY IN SECOND TRIMESTER (HCC): ICD-10-CM

## 2024-08-12 LAB
SL AMB  POCT GLUCOSE, UA: NORMAL
SL AMB POCT URINE PROTEIN: NORMAL

## 2024-08-12 PROCEDURE — 81002 URINALYSIS NONAUTO W/O SCOPE: CPT | Performed by: OBSTETRICS & GYNECOLOGY

## 2024-08-12 PROCEDURE — 99213 OFFICE O/P EST LOW 20 MIN: CPT | Performed by: OBSTETRICS & GYNECOLOGY

## 2024-08-12 NOTE — ASSESSMENT & PLAN NOTE
Will check swab as it is not improving after two courses of monostat. Discussed milia is normal finding. Avoid soap, wash only with water. Desitin to help with symptoms relief until results back of nuswab.

## 2024-08-12 NOTE — PROGRESS NOTES
"Routine Prenatal Visit  [facility] [address], [address], [place] [address]    Assessment/Plan:  Janice is a 19 y.o. year old  at 32w4d who presents for routine prenatal visit.     1. 32 weeks gestation of pregnancy  -     POCT urine dip  2. Encounter for immunization  3. Short interval between pregnancies affecting pregnancy in second trimester, antepartum  4. Severe obesity due to excess calories affecting pregnancy in second trimester ([facility])  Assessment & Plan:  Most recent growth wnl  5. Vaginal discharge during pregnancy in third trimester  Assessment & Plan:  Will check swab as it is not improving after two courses of monostat. Discussed milia is normal finding. Avoid soap, wash only with water. Desitin to help with symptoms relief until results back of nuswab.   Orders:  -     NuSwab Vaginitis Plus (VG+)        Subjective:   Janice Wallace is a 19 y.o.  who presents for routine prenatal care at 32w4d.  Complaints today: Denies  LOF: -; VB: -; Contractions: -; FM: +    Objective:  /70 (BP Location: Left arm, Patient Position: Sitting, Cuff Size: Standard)   Ht 5' 3\" (1.6 m)   Wt 86.6 kg (191 lb)   LMP 2023   BMI 33.83 kg/m²     General: Well appearing, no distress  Respiratory: Unlabored breathing  Abdomen: Soft, gravid, nontender  Pelvic: no abnormal lumps or rashes, milia present  Extremities: Warm and well perfused.  Non tender.    Pregravid Weight/BMI: Pregravid weight not on file (BMI Could not be calculated)  Current Weight: 86.6 kg (191 lb)   Total Weight Gain: Not found.     Pre- Vitals      Flowsheet Row Most Recent Value   Prenatal Assessment    Fetal Heart Rate 150   Movement Present   Prenatal Vitals    Blood Pressure 128/70   Weight - Scale 86.6 kg (191 lb)   Urine Albumin/Glucose    Dilation/Effacement/Station    Vaginal Drainage    Edema              Irving Joya DO  2024 6:04 PM     "

## 2024-08-13 ENCOUNTER — TELEPHONE (OUTPATIENT)
Age: 19
End: 2024-08-13

## 2024-08-13 ENCOUNTER — PATIENT OUTREACH (OUTPATIENT)
Dept: OBGYN CLINIC | Facility: CLINIC | Age: 19
End: 2024-08-13

## 2024-08-13 NOTE — TELEPHONE ENCOUNTER
Patient called in to see if her swab from 08/12/2024 has resulted, I informed patient that it has not yet resulted.

## 2024-08-13 NOTE — PROGRESS NOTES
SW called patient to f/u on progress with finding a therapist. Patient stated she reviewed list sent by SW and contacted an office that offered virtual, but did not end up scheduling yet. For now, she has been feeling a lot better and is unsure if therapy is still needed now. She stated she will reach out to SW if she needs further assistance or has questions later on. SW closing referral, please re-refer as needed.

## 2024-08-14 LAB
A VAGINAE DNA VAG QL NAA+PROBE: ABNORMAL SCORE
BVAB2 DNA VAG QL NAA+PROBE: ABNORMAL SCORE
C ALBICANS DNA VAG QL NAA+PROBE: POSITIVE
C GLABRATA DNA VAG QL NAA+PROBE: NEGATIVE
C TRACH RRNA SPEC QL NAA+PROBE: POSITIVE
MEGA1 DNA VAG QL NAA+PROBE: ABNORMAL SCORE
N GONORRHOEA RRNA SPEC QL NAA+PROBE: NEGATIVE
T VAGINALIS RRNA SPEC QL NAA+PROBE: NEGATIVE

## 2024-08-14 NOTE — TELEPHONE ENCOUNTER
Patient called to inquire if results are available. NuSwab Vaginitis Plus lab reflects in process/ not yet resulted. Informed pt once resulted, [facility] & ordering physician will review & provide recommendation. Pt verbalized understanding

## 2024-08-15 DIAGNOSIS — O98.813 CHLAMYDIA INFECTION AFFECTING PREGNANCY IN THIRD TRIMESTER: Primary | ICD-10-CM

## 2024-08-15 DIAGNOSIS — A74.9 CHLAMYDIA INFECTION AFFECTING PREGNANCY IN THIRD TRIMESTER: Primary | ICD-10-CM

## 2024-08-15 RX ORDER — AZITHROMYCIN 500 MG/1
1000 TABLET, FILM COATED ORAL ONCE
Qty: 2 TABLET | Refills: 0 | Status: SHIPPED | OUTPATIENT
Start: 2024-08-15 | End: 2024-08-15

## 2024-08-17 ENCOUNTER — TELEPHONE (OUTPATIENT)
Dept: OTHER | Facility: OTHER | Age: 19
End: 2024-08-17

## 2024-08-17 ENCOUNTER — NURSE TRIAGE (OUTPATIENT)
Dept: OTHER | Facility: OTHER | Age: 19
End: 2024-08-17

## 2024-08-17 DIAGNOSIS — A74.9 CHLAMYDIA: Primary | ICD-10-CM

## 2024-08-17 RX ORDER — AZITHROMYCIN 500 MG/1
500 TABLET, FILM COATED ORAL 2 TIMES DAILY
Qty: 2 TABLET | Refills: 0 | Status: SHIPPED | OUTPATIENT
Start: 2024-08-17 | End: 2024-08-19

## 2024-08-17 NOTE — TELEPHONE ENCOUNTER
"Regarding: rx lost  ----- Message from Christina MATHUR sent at 8/17/2024 12:30 PM EDT -----  \"I was given an antibiotic to take for chlamydia but I lost it\"    "

## 2024-08-17 NOTE — TELEPHONE ENCOUNTER
"Pt stated, \" I received a call from this number.\"     Pt notified that pharmacy received medication.  "

## 2024-08-17 NOTE — TELEPHONE ENCOUNTER
"Answer Assessment - Initial Assessment Questions  1. NAME of MEDICATION: \"What medicine are you calling about?\"      Zithromax     2. QUESTION: \"What is your question?\" (e.g., medication refill, side effect)      Patient states she lost medication           6. PREGNANCY:  \"Is there any chance that you are pregnant?\" \"When was your last menstrual period?\"      Yes, currently 33 weeks.    Protocols used: Medication Question Call-ADULT-    "

## 2024-08-19 ENCOUNTER — HOSPITAL ENCOUNTER (OUTPATIENT)
Facility: HOSPITAL | Age: 19
Discharge: HOME | End: 2024-08-19
Attending: OBSTETRICS & GYNECOLOGY | Admitting: OBSTETRICS & GYNECOLOGY
Payer: COMMERCIAL

## 2024-08-19 ENCOUNTER — NURSE TRIAGE (OUTPATIENT)
Age: 19
End: 2024-08-19

## 2024-08-19 VITALS
DIASTOLIC BLOOD PRESSURE: 58 MMHG | TEMPERATURE: 98 F | HEART RATE: 95 BPM | SYSTOLIC BLOOD PRESSURE: 121 MMHG | RESPIRATION RATE: 18 BRPM

## 2024-08-19 DIAGNOSIS — A74.9 CHLAMYDIA: ICD-10-CM

## 2024-08-19 PROBLEM — O47.03 PRETERM UTERINE CONTRACTIONS IN THIRD TRIMESTER, ANTEPARTUM: Status: ACTIVE | Noted: 2024-08-19

## 2024-08-19 LAB — FIBRONECTIN FETAL VAG QL: NEGATIVE

## 2024-08-19 PROCEDURE — 82731 ASSAY OF FETAL FIBRONECTIN: CPT | Performed by: OBSTETRICS & GYNECOLOGY

## 2024-08-19 PROCEDURE — 99214 OFFICE O/P EST MOD 30 MIN: CPT

## 2024-08-19 PROCEDURE — NC001 PR NO CHARGE: Performed by: OBSTETRICS & GYNECOLOGY

## 2024-08-19 PROCEDURE — 59025 FETAL NON-STRESS TEST: CPT | Performed by: OBSTETRICS & GYNECOLOGY

## 2024-08-19 NOTE — PROGRESS NOTES
Triage Note - OB  Janice MendozaitoDeleon 19 y.o. female MRN: [id number]  Unit/Bed#: LD TRIAGE 3 Encounter: [id number]    Chief Complaint: No chief complaint on file.    CHRISTOPHER: Estimated Date of Delivery: 10/3/24    HPI: 19 y.o. female  at 33w4d presents with contractions- pt states that they were every 5 minutes yesterday but only felt 2 contractions since being on the monitor.  + intercourse 2 days ago.  No bleeding or LOF, no dysuria. Took azithromycin yesterday for chlamydia infection.  States partner was also treated    Vitals: Blood pressure 121/58, pulse 95, temperature 98 °F (36.7 °C), temperature source Oral, resp. rate 18, last menstrual period 2023.,There is no height or weight on file to calculate BMI.    Physical Exam  GEN: well developed and well nourished, alert, oriented times 3, and appears comfortable    Abd: soft, non tender, and gravid  SVE: closed/thick/posterior    FHR: 130  [place]: occasional >10 min apart occ every 2-3 min but then space out again    Labs:   No visits with results within 1 Day(s) from this visit.   Latest known visit with results is:   Routine Prenatal on 2024   Component Date Value    POCT URINE PROTEIN 2024 neg     GLUCOSE, UA 2024 neg     Atopobium vaginae 2024 Moderate - 1     BVAB 2 2024 Low - 0     Megasphaera 1 2024 Low - 0     Candida albicans, CRYSTAL 2024 Positive (A)     Janene glabrata, CRYSTAL 2024 Negative     Trichomonas vaginosis 2024 Negative     Chlamydia trachomatis, N* 2024 Positive (A)     Neisseria gonorrhoeae, N* 2024 Negative        Lab, Imaging and other studies: I have personally reviewed pertinent reports.    A/P: 19 y.o. female  at 33w4d with  contractions.   1) FFN - negative   2) Needs VIRGIL for chlamydia- took antibiotics 24   3) Discharge instructions given to patient and labor precautions reviewed.

## 2024-08-19 NOTE — PROCEDURES
Janice aWllace, a  at 33w4d with an CHRISTOPHER of 10/3/2024, by Ultrasound, was seen at [facility] for the following procedure(s): $Procedure Type: NST]    Nonstress Test  Reason for NST: Other (Comment)  Variability: Moderate  Decelerations: None  Accelerations: Yes  Baseline: 130 BPM  Uterine Irritability: No  Contractions: Irregular  Contraction Frequency (minutes): 10 min (greater than)                   Interpretation  Nonstress Test Interpretation: Reactive  Overall Impression: Reassuring

## 2024-08-19 NOTE — TELEPHONE ENCOUNTER
"33 weeks 4 days pregnant c/o contractions every 5 minutes that are lasting 40 seconds. They started about 2 hours ago. She did have contractions for an hour last night that went away when she went to bed. This is her second baby. She denies decreased fetal movement, vaginal bleeding, loss of fluid or other symptoms. Advised to go to [facility] [address]. She said she would get there in 2 hours, advised to go as soon as possible.   ESC message to Dr. Vallejo.       Reason for Disposition   Contractions < 10 minutes apart for 1 hour (i.e., 6 or more contractions an hour)    Answer Assessment - Initial Assessment Questions  1. ONSET: \"When did the symptoms begin?\"         2 hours ago   2. CONTRACTIONS: \"Describe the contractions that you are having.\" (e.g., duration, frequency, regularity, severity)      Every 5 minutes. Last 40 seconds.   3. CHRISTOPHER: \"What date are you expecting to deliver?\"      10/3/24  4. PARITY: \"Have you had a baby before?\" If Yes, ask: \"How long did the labor last?\"      Had one baby before - induced, labor was fast.   5. FETAL MOVEMENT: \"Has the baby's movement decreased or changed significantly from normal?\"      No   6. OTHER SYMPTOMS: \"Do you have any other symptoms?\" (e.g., leaking fluid from vagina, fever, hand/facial swelling)      No    Protocols used: Pregnancy - Labor - -ADULT-[place]    "

## 2024-08-28 ENCOUNTER — ROUTINE PRENATAL (OUTPATIENT)
Dept: OBGYN CLINIC | Facility: CLINIC | Age: 19
End: 2024-08-28
Payer: COMMERCIAL

## 2024-08-28 VITALS
BODY MASS INDEX: 34.02 KG/M2 | DIASTOLIC BLOOD PRESSURE: 70 MMHG | HEIGHT: 63 IN | SYSTOLIC BLOOD PRESSURE: 130 MMHG | WEIGHT: 192 LBS

## 2024-08-28 DIAGNOSIS — O47.03 PRETERM UTERINE CONTRACTIONS IN THIRD TRIMESTER, ANTEPARTUM: ICD-10-CM

## 2024-08-28 DIAGNOSIS — O99.212 SEVERE OBESITY DUE TO EXCESS CALORIES AFFECTING PREGNANCY IN SECOND TRIMESTER (HCC): ICD-10-CM

## 2024-08-28 DIAGNOSIS — O99.013 ANEMIA AFFECTING PREGNANCY IN THIRD TRIMESTER: ICD-10-CM

## 2024-08-28 DIAGNOSIS — Z3A.34 34 WEEKS GESTATION OF PREGNANCY: ICD-10-CM

## 2024-08-28 DIAGNOSIS — A74.9 CHLAMYDIA INFECTION AFFECTING PREGNANCY IN THIRD TRIMESTER: ICD-10-CM

## 2024-08-28 DIAGNOSIS — O98.813 CHLAMYDIA INFECTION AFFECTING PREGNANCY IN THIRD TRIMESTER: ICD-10-CM

## 2024-08-28 DIAGNOSIS — E66.01 SEVERE OBESITY DUE TO EXCESS CALORIES AFFECTING PREGNANCY IN SECOND TRIMESTER (HCC): ICD-10-CM

## 2024-08-28 DIAGNOSIS — O09.892 SHORT INTERVAL BETWEEN PREGNANCIES AFFECTING PREGNANCY IN SECOND TRIMESTER, ANTEPARTUM: Primary | ICD-10-CM

## 2024-08-28 LAB
SL AMB  POCT GLUCOSE, UA: NORMAL
SL AMB POCT URINE PROTEIN: NORMAL

## 2024-08-28 PROCEDURE — 81002 URINALYSIS NONAUTO W/O SCOPE: CPT | Performed by: NURSE PRACTITIONER

## 2024-08-28 PROCEDURE — 99214 OFFICE O/P EST MOD 30 MIN: CPT | Performed by: NURSE PRACTITIONER

## 2024-08-28 NOTE — PROGRESS NOTES
"Routine Prenatal Visit  [facility] [place]  [address], [place] [address]    Assessment/Plan:  Janice is a 19 y.o. year old  at 34w6d who presents for routine prenatal visit.     1. Short interval between pregnancies affecting pregnancy in second trimester, antepartum  2. Severe obesity due to excess calories affecting pregnancy in second trimester (HCC)  Assessment & Plan:  Ultrasound  55 th percentile  3. Chlamydia infection affecting pregnancy in third trimester  Assessment & Plan:  Treated 8/15, partner treated  Needs VIRGIL at 4 weeks post tx  4.  uterine contractions in third trimester, antepartum  Assessment & Plan:  Seen on labor and delivery, no meds needed  5. 34 weeks gestation of pregnancy  -     POCT urine dip  6. Anemia affecting pregnancy in third trimester  Assessment & Plan:  HBG 10.2 Taking iron once daily, iron rich foods  Repeat CBC with Ferritin ordered  Orders:  -     CBC and differential; Future  -     Ferritin; Future  -     CBC and differential  -     Ferritin      Next OB Visit 2 weeks.    Subjective:     CC: Prenatal care    Janice Wallace is a 19 y.o.  female who presents for routine prenatal care at 34w6d. Pt feels well, no complaints today.  Pregnancy ROS: no leakage of fluid, pelvic pain, or vaginal bleeding.  normal fetal movement.    The following portions of the patient's history were reviewed and updated as appropriate: allergies, current medications, past family history, past medical history, obstetric history, gynecologic history, past social history, past surgical history and problem list.      Objective:  /70 (BP Location: Left arm, Patient Position: Sitting, Cuff Size: Standard)   Ht 5' 3\" (1.6 m)   Wt 87.1 kg (192 lb)   LMP 2023   BMI 34.01 kg/m²   Pregravid Weight/BMI: Pregravid weight not on file (BMI Could not be calculated)  Current Weight: 87.1 kg (192 lb)   Total Weight Gain: Not found. "   Pre-Christina Vitals      Flowsheet Row Most Recent Value   Prenatal Assessment    Fetal Heart Rate 140   Fundal Height (cm) 33 cm   Movement Present   Presentation Vertex   Prenatal Vitals    Blood Pressure 130/70   Weight - Scale 87.1 kg (192 lb)   Urine Albumin/Glucose    Dilation/Effacement/Station    Vaginal Drainage    Draining Fluid No   Edema    LLE Edema None   RLE Edema None   Facial Edema None             General: Well appearing, no distress  Abdomen: Soft, gravid, nontender  Extremities: Non tender.

## 2024-09-01 ENCOUNTER — HOSPITAL ENCOUNTER (OUTPATIENT)
Facility: HOSPITAL | Age: 19
Discharge: HOME | End: 2024-09-01
Attending: STUDENT IN AN ORGANIZED HEALTH CARE EDUCATION/TRAINING PROGRAM | Admitting: STUDENT IN AN ORGANIZED HEALTH CARE EDUCATION/TRAINING PROGRAM
Payer: COMMERCIAL

## 2024-09-01 ENCOUNTER — NURSE TRIAGE (OUTPATIENT)
Dept: OTHER | Facility: OTHER | Age: 19
End: 2024-09-01

## 2024-09-01 VITALS
SYSTOLIC BLOOD PRESSURE: 130 MMHG | HEART RATE: 108 BPM | RESPIRATION RATE: 18 BRPM | OXYGEN SATURATION: 99 % | TEMPERATURE: 97.9 F | DIASTOLIC BLOOD PRESSURE: 69 MMHG

## 2024-09-01 DIAGNOSIS — Z3A.35 35 WEEKS GESTATION OF PREGNANCY: Primary | ICD-10-CM

## 2024-09-01 LAB
BACTERIA UR QL AUTO: ABNORMAL /HPF
BILIRUB UR QL STRIP: NEGATIVE
BUDDING YEAST: PRESENT
CLARITY UR: ABNORMAL
COLOR UR: ABNORMAL
GLUCOSE UR STRIP-MCNC: NEGATIVE MG/DL
HGB UR QL STRIP.AUTO: ABNORMAL
HYPHAE YEAST: PRESENT
KETONES UR STRIP-MCNC: ABNORMAL MG/DL
LEUKOCYTE ESTERASE UR QL STRIP: ABNORMAL
NITRITE UR QL STRIP: NEGATIVE
NON-SQ EPI CELLS URNS QL MICRO: ABNORMAL /HPF
PH UR STRIP.AUTO: 6.5 [PH]
PROT UR STRIP-MCNC: ABNORMAL MG/DL
RBC #/AREA URNS AUTO: ABNORMAL /HPF
SP GR UR STRIP.AUTO: 1.02 (ref 1–1.03)
UROBILINOGEN UR STRIP-ACNC: <2 MG/DL
WBC #/AREA URNS AUTO: ABNORMAL /HPF

## 2024-09-01 PROCEDURE — 81514 NFCT DS BV&VAGINITIS DNA ALG: CPT | Performed by: OBSTETRICS & GYNECOLOGY

## 2024-09-01 PROCEDURE — 87147 CULTURE TYPE IMMUNOLOGIC: CPT | Performed by: OBSTETRICS & GYNECOLOGY

## 2024-09-01 PROCEDURE — 87086 URINE CULTURE/COLONY COUNT: CPT | Performed by: OBSTETRICS & GYNECOLOGY

## 2024-09-01 PROCEDURE — 59025 FETAL NON-STRESS TEST: CPT | Performed by: OBSTETRICS & GYNECOLOGY

## 2024-09-01 PROCEDURE — 99212 OFFICE O/P EST SF 10 MIN: CPT

## 2024-09-01 PROCEDURE — 87591 N.GONORRHOEAE DNA AMP PROB: CPT | Performed by: OBSTETRICS & GYNECOLOGY

## 2024-09-01 PROCEDURE — 87491 CHLMYD TRACH DNA AMP PROBE: CPT | Performed by: OBSTETRICS & GYNECOLOGY

## 2024-09-01 PROCEDURE — NC001 PR NO CHARGE: Performed by: OBSTETRICS & GYNECOLOGY

## 2024-09-01 PROCEDURE — 81001 URINALYSIS AUTO W/SCOPE: CPT | Performed by: OBSTETRICS & GYNECOLOGY

## 2024-09-01 RX ORDER — CLOTRIMAZOLE 1 %
CREAM WITH APPLICATOR VAGINAL
Qty: 40 G | Refills: 3 | Status: SHIPPED | OUTPATIENT
Start: 2024-09-01

## 2024-09-01 RX ADMIN — SODIUM CHLORIDE 1000 ML: 0.9 INJECTION, SOLUTION INTRAVENOUS at 15:16

## 2024-09-01 NOTE — TELEPHONE ENCOUNTER
"Regardin weeks/Chlamydia infection pain  ----- Message from Sarah RIVAS sent at 2024 12:11 PM EDT -----  \" I am 35 weeks and I think my infection came back. I'm having green discharge, itchiness and burning when urinating.\"    "

## 2024-09-01 NOTE — TELEPHONE ENCOUNTER
"Answer Assessment - Initial Assessment Questions  1. DISCHARGE: \"Describe the discharge.\" (e.g., white, yellow, green, gray, foamy, cottage cheese-like)      Green discharge     2. ODOR: \"Is there a bad odor?\"      No     3. ONSET: \"When did the discharge begin?\"      About three days ago    4. RASH: \"Is there a rash in that area?\" If Yes, ask: \"Describe it.\" (e.g., redness, blisters, sores, bumps)      Rash    5. ABDOMINAL PAIN: \"Are you having any abdominal pain?\" If Yes, ask: \"What does it feel like?\" (e.g., crampy, dull, intermittent, constant)       Denies    6. ABDOMINAL PAIN SEVERITY: If present, ask: \"How bad is it?\"  (e.g., Scale 1-10; mild, moderate, or severe)    - MILD (1-3): doesn't interfere with normal activities, abdomen soft and not tender to touch     - MODERATE (4-7): interferes with normal activities or awakens from sleep, tender to touch     - SEVERE (8-10): excruciating pain, doubled over, unable to do any normal activities      0/10    7. CAUSE: \"What do you think is causing the discharge?\"      Thinks infection has returned. Just treated recently for chlamydia    8. OTHER SYMPTOMS: \"Do you have any other symptoms?\" (e.g., fever, itching, vaginal bleeding, pain with urination)      Itchiness, discomfort, and pain with urination. No fevers. No changes to FM. Denies any other vaginal leakage/bleeding    9. CHRISTOPHER: \"What date are you expecting to deliver?\"       10/3/2024    10. PREGNANCY: \"How many weeks pregnant are you?\"        34W3D    Protocols used: Pregnancy - Vaginal Discharge-ADULT-AH    "

## 2024-09-01 NOTE — H&P
"Ob/Gyn History and Physical  Janice Wallace 19 y.o. female MRN: [id number]  Unit/Bed#: LD TRIAGE  Encounter: [id number]    A/P. 19 y.o.  at 35w3d, recently treated for chlamydia, now with vaginitis symptoms after [facility]IC with coinfected partner.  (1) Chlamydia.  Treated 8/15/24 (two weeks ago) and had [facility] with partner seven days after both were treated.  Unable to state today whether infection present.      Considered treating empirically, but can have chlamydia result back in ~24 hours.  May result false positive due to recent treatment, as CT nucleic acid may be present for a time after treatment even with no viable organisms, and we discussed this.    Considered waiting 2 more weeks to send Pito, but now 35+ weeks pregnant, and would like to ensure treated before delivery.  --> Send chlamydia now.  --> Repeat treatment (azithromycin 1000mg po x 1) and EPT if test results (+).    (2) Recurrent VVC.  Last vaginitis panel showed c. albicans in addition to chlamydia; no glabrata.  Should be sensitive to imidazoles.  Frustrated with recurrence.  Will start induction and then maintenance regimen.  --> Clotrimazole cream 1% qHS x 14 days, then twice weekly until delivery.  --> Repeat molecular vaginitis panel today.    (3) Mild tachycardia.  HR 110s-120 on arrival.  Was nervous as well.  Got 1L NSS, and .  Asymptomatic and normal exam.  --> Discharge home with precs.    (4) Fetal status reassuring.  Reactive NST.  --> Okay to discontinue monitoring.    Marlon Gomez MD  24  Case discussed with on-call physician for Dr. Clark Blum.    -------------------------------------------------------------------------------------------------------  CC/    \"I have discharge.\"    HPI/    Recently treated for chlamydia 8/15/24.  States partner treated same time.  Had UPIC one week later.  Now again with vaginal discharge, itching, no odor, symptoms consistent with what she " experienced prior to treatment for chlamydia.    Also reports being treated for yeast multiple times, with only temporary success.    Otherwise well.    No UC/VB/LoF.  (+)FM.    No CP/dyspnea.  No N/V/D.  (+)dysuria.    Pregnancy complications/      Patient Active Problem List   Diagnosis    35 weeks gestation of pregnancy    Prenatal care, subsequent pregnancy, first trimester    Short interval between pregnancies affecting pregnancy in second trimester, antepartum    Severe obesity due to excess calories affecting pregnancy in second trimester (HCC)    Pelvic pain in pregnancy    Rash    Anemia affecting pregnancy in third trimester    Vaginal discharge during pregnancy in third trimester    Chlamydia infection affecting pregnancy in third trimester     uterine contractions in third trimester, antepartum       Allergies/      Allergies as of 2024    (No Known Allergies)       Rx/      No current facility-administered medications on file prior to encounter.     Current Outpatient Medications on File Prior to Encounter   Medication Sig Dispense Refill    Czigqe-AcXjf-KrEjp-FA-CA-Omega (Complete  DHA) 29-1-200 & 200 MG MISC Take 1 tablet by mouth daily 90 each 2    sertraline (Zoloft) 50 mg tablet Take 1 tablet (50 mg total) by mouth daily 30 tablet 2    cetirizine (ZyrTEC) 5 MG tablet Take 1 tablet (5 mg total) by mouth daily 30 tablet 0       PMH/    No past medical history on file.    PSH/    No past surgical history on file.    ObHx/    , expecting a baby boy      OB History    Para Term  AB Living   3 1 1 0 1 1   SAB IAB Ectopic Multiple Live Births   1 0 0 0 1      # Outcome Date GA Lbr Iraj/2nd Weight Sex Type Anes PTL Lv   3 Current            2 Term 23 41w6d  2750 g (6 lb 1 oz) F Vag-Spont EPI N THELMA      Birth Comments: Uncomplicated pregnacy   1 SAB 2022               GynHx/    She had chlamydia and was treated last month.    FH/    Family History   Problem  Relation Age of Onset    No Known Problems Mother     Diabetes type II Father     No Known Problems Sister     No Known Problems Sister     No Known Problems Brother     No Known Problems Daughter     Diabetes type II Maternal Grandmother     No Known Problems Maternal Grandfather     Diabetes type II Paternal Grandmother     Lung cancer Paternal Grandfather        SH/    She is .    She works for [employer].    She does not use tobacco, alcohol, or illicit drugs.      Social History     Socioeconomic History    Marital status: /Civil Union     Spouse name: Not on file    Number of children: Not on file    Years of education: Not on file    Highest education level: Not on file   Occupational History    Not on file   Tobacco Use    Smoking status: Never     Passive exposure: Never    Smokeless tobacco: Never   Vaping Use    Vaping status: Never Used   Substance and Sexual Activity    Alcohol use: Not Currently    Drug use: Never    Sexual activity: Yes     Partners: Male     Birth control/protection: None   Other Topics Concern    Not on file   Social History Narrative    Not on file     Social Determinants of Health     Financial Resource Strain: Not on file   Food Insecurity: No Food Insecurity (6/19/2024)    Hunger Vital Sign     Worried About Running Out of Food in the Last Year: Never true     Ran Out of Food in the Last Year: Never true   Transportation Needs: No Transportation Needs (6/19/2024)    PRAPARE - Transportation     Lack of Transportation (Medical): No     Lack of Transportation (Non-Medical): No   Physical Activity: Not on file   Stress: Not on file   Social Connections: Not on file   Intimate Partner Violence: Not on file   Housing Stability: Low Risk  (6/19/2024)    Housing Stability Vital Sign     Unable to Pay for Housing in the Last Year: No     Number of Times Moved in the Last Year: 0     Homeless in the Last Year: No       RoS/    Constitutional: Negative    CV: Negative    Pulm:  Negative    GI: Negative    Urinary: Negative    Neuro: Negative    Musculoskeletal: Negative    O/  /69 (BP Location: Right arm)   Pulse (!) 117   Temp 97.9 °F (36.6 °C) (Oral)   Resp 18   LMP 12/20/2023   SpO2 100%     Alert, comfortable, no acute distress    Regular rate and rhythm    Clear to auscultation bilaterally    Abdomen soft, nontender, nondistended.    Fundus nontender, size consistent with dates    Gyn/      Normal female external genitalia.      Vault well-estrogenized, well-supported.        Thick milky-white discharge        Wet mount: Yeast      Pelvis adequate/gynecoid.        Cervix:           Dilation: Closed         Effacement: 0%         Station: Floating  Consistency: Medium         Position: Posterior   Presentation:      FHR: 125 moderate variability.  (+) accels, no decels.  Reactive.  [place]:  No apparent contractions    Prenatal labs/  Lab Results   Component Value Date    ABO O 04/15/2024    RH Positive 04/15/2024    ABS Normal 04/15/2024    HGB 10.2 (L) 07/13/2024     07/13/2024    EXTRUBELIGGQ Immune 04/15/2024    RPR Non Reactive 07/13/2024    HEPBSAG Negative 04/15/2024    HEPCAB Non Reactive 04/15/2024    HIVAGAB Non-Reactive 04/15/2024    GC Negative 03/20/2024    [id number] 112 07/13/2024

## 2024-09-01 NOTE — TELEPHONE ENCOUNTER
Reason for Disposition  • Patient sounds very sick or weak to the triager    Protocols used: Pregnancy - Vaginal Discharge-ADULT-AH

## 2024-09-02 LAB
BACTERIA UR CULT: ABNORMAL
BACTERIA UR CULT: ABNORMAL

## 2024-09-02 NOTE — PROCEDURES
Janice Wallace, a  at 35w4d with an CHRISTOPHER of 10/3/2024, by Ultrasound, was seen at [facility] for the following procedure(s): $Procedure Type: NST]    Nonstress Test  Reason for NST: Routine  Variability: Moderate  Decelerations: None  Accelerations: Yes  Acoustic Stimulator: No  Baseline: 125 BPM  Uterine Irritability: No  Contractions: Not present    Interpretation  Nonstress Test Interpretation: Reactive  Overall Impression: Reassuring    Marlon Gomez MD

## 2024-09-03 ENCOUNTER — TELEPHONE (OUTPATIENT)
Dept: LABOR AND DELIVERY | Facility: HOSPITAL | Age: 19
End: 2024-09-03

## 2024-09-03 DIAGNOSIS — B95.1 GBS (GROUP B STREPTOCOCCUS) UTI COMPLICATING PREGNANCY, THIRD TRIMESTER: Primary | ICD-10-CM

## 2024-09-03 DIAGNOSIS — O23.43 GBS (GROUP B STREPTOCOCCUS) UTI COMPLICATING PREGNANCY, THIRD TRIMESTER: Primary | ICD-10-CM

## 2024-09-03 LAB
C GLABRATA DNA VAG QL NAA+PROBE: NEGATIVE
C KRUSEI DNA VAG QL NAA+PROBE: NEGATIVE
C TRACH DNA SPEC QL NAA+PROBE: NEGATIVE
CANDIDA SP 6 PNL VAG NAA+PROBE: POSITIVE
N GONORRHOEA DNA SPEC QL NAA+PROBE: NEGATIVE
T VAGINALIS DNA VAG QL NAA+PROBE: NEGATIVE
VAGINOSIS/ITIS DNA PNL VAG PROBE+SIG AMP: NEGATIVE

## 2024-09-03 RX ORDER — PENICILLIN V POTASSIUM 500 MG/1
500 TABLET, FILM COATED ORAL EVERY 6 HOURS SCHEDULED
Qty: 28 TABLET | Refills: 0 | Status: SHIPPED | OUTPATIENT
Start: 2024-09-03 | End: 2024-09-10

## 2024-09-03 NOTE — TELEPHONE ENCOUNTER
Telephone call:    Note GBS(+) UCx from 9/1/24.    Called [phone] and spoke with Janice.  She states she is feeling much better having used the clotrimazole cream.    I discussed with her the (+)UCx for GBS, and recommended treatment with penicillin qid.      Further discussed implications for pregnancy and labor and delivery, and need for treatment intrapartum.      Following our discussion, I answered all of her questions to her satisfaction, and she stated her understanding.    --> Penicillin VK 500mg qid x 7 days.  --> Intrapartum prophylaxis.    Marlon Gomez MD    ----------------------------------------  Addendum:    Molecular vaginitis panel resulted (+)Candida, as expected, (-) c.glabrata, (-) trich.    Already treated.  Awaiting CT/GC.    Marlon Gomez MD

## 2024-09-07 ENCOUNTER — TELEPHONE (OUTPATIENT)
Dept: LABOR AND DELIVERY | Facility: HOSPITAL | Age: 19
End: 2024-09-07

## 2024-09-07 DIAGNOSIS — B37.31 VULVOVAGINAL CANDIDIASIS: Primary | ICD-10-CM

## 2024-09-07 RX ORDER — CLOTRIMAZOLE 1 %
CREAM WITH APPLICATOR VAGINAL
Qty: 135 G | Refills: 0 | Status: SHIPPED | OUTPATIENT
Start: 2024-09-07

## 2024-09-07 NOTE — TELEPHONE ENCOUNTER
Telephone call:    Vaginitis panel (+)candida (Neg glabrata/krusei)  GC/CT negative    Called and reviewed all of this with Janice.  She is feeling better.  States the pharmacy only gave her enough clotrimazole for 7 day treatment.    New Rx sent. (Plan is qHS x 14 days then 2x/week suppressive therapy)    Marlon Gomez MD

## 2024-09-09 ENCOUNTER — ROUTINE PRENATAL (OUTPATIENT)
Dept: OBGYN CLINIC | Facility: CLINIC | Age: 19
End: 2024-09-09
Payer: COMMERCIAL

## 2024-09-09 VITALS
SYSTOLIC BLOOD PRESSURE: 112 MMHG | BODY MASS INDEX: 34.05 KG/M2 | DIASTOLIC BLOOD PRESSURE: 62 MMHG | HEIGHT: 63 IN | WEIGHT: 192.2 LBS

## 2024-09-09 DIAGNOSIS — A74.9 CHLAMYDIA INFECTION AFFECTING PREGNANCY IN THIRD TRIMESTER: ICD-10-CM

## 2024-09-09 DIAGNOSIS — Z23 ENCOUNTER FOR IMMUNIZATION: ICD-10-CM

## 2024-09-09 DIAGNOSIS — Z3A.36 36 WEEKS GESTATION OF PREGNANCY: ICD-10-CM

## 2024-09-09 DIAGNOSIS — O98.813 CHLAMYDIA INFECTION AFFECTING PREGNANCY IN THIRD TRIMESTER: ICD-10-CM

## 2024-09-09 DIAGNOSIS — Z36.85 ANTENATAL SCREENING FOR STREPTOCOCCUS B: Primary | ICD-10-CM

## 2024-09-09 DIAGNOSIS — O09.892 SHORT INTERVAL BETWEEN PREGNANCIES AFFECTING PREGNANCY IN SECOND TRIMESTER, ANTEPARTUM: ICD-10-CM

## 2024-09-09 DIAGNOSIS — Z23 NEED FOR TDAP VACCINATION: ICD-10-CM

## 2024-09-09 DIAGNOSIS — E66.01 SEVERE OBESITY DUE TO EXCESS CALORIES AFFECTING PREGNANCY IN SECOND TRIMESTER (HCC): ICD-10-CM

## 2024-09-09 DIAGNOSIS — O47.03 PRETERM UTERINE CONTRACTIONS IN THIRD TRIMESTER, ANTEPARTUM: ICD-10-CM

## 2024-09-09 DIAGNOSIS — O99.212 SEVERE OBESITY DUE TO EXCESS CALORIES AFFECTING PREGNANCY IN SECOND TRIMESTER (HCC): ICD-10-CM

## 2024-09-09 LAB
SL AMB  POCT GLUCOSE, UA: NORMAL
SL AMB POCT URINE PROTEIN: NORMAL

## 2024-09-09 PROCEDURE — 99213 OFFICE O/P EST LOW 20 MIN: CPT | Performed by: OBSTETRICS & GYNECOLOGY

## 2024-09-09 PROCEDURE — 90471 IMMUNIZATION ADMIN: CPT | Performed by: OBSTETRICS & GYNECOLOGY

## 2024-09-09 PROCEDURE — 81002 URINALYSIS NONAUTO W/O SCOPE: CPT | Performed by: OBSTETRICS & GYNECOLOGY

## 2024-09-09 PROCEDURE — 90715 TDAP VACCINE 7 YRS/> IM: CPT | Performed by: OBSTETRICS & GYNECOLOGY

## 2024-09-09 NOTE — PROGRESS NOTES
"Routine Prenatal Visit  [facility] - [address], [address], [place] [address]    Assessment/Plan:  Janice is a 19 y.o. year old  at 36w4d who presents for routine prenatal visit.     1.  screening for streptococcus B  2. 36 weeks gestation of pregnancy  -     POCT urine dip  3. Short interval between pregnancies affecting pregnancy in second trimester, antepartum  4. Severe obesity due to excess calories affecting pregnancy in second trimester (HCC)  5. Chlamydia infection affecting pregnancy in third trimester     -  Treated.  VIRGIL negative 24  6. Encounter for immunization  -     Tdap Vaccine greater than or equal to 6yo  7. Need for Tdap vaccination  -     Tdap Vaccine greater than or equal to 6yo      Next OB Visit 1 weeks.    Subjective:     CC: Prenatal care    Janice Wallace is a 19 y.o.  female who presents for routine prenatal care at 36w4d.  Pregnancy ROS: no leakage of fluid, pelvic pain, or vaginal bleeding.  normal fetal movement.    The following portions of the patient's history were reviewed and updated as appropriate: allergies, current medications, past family history, past medical history, obstetric history, gynecologic history, past social history, past surgical history and problem list.      Objective:  /62 (BP Location: Left arm, Patient Position: Sitting, Cuff Size: Standard)   Ht 5' 3\" (1.6 m)   Wt 87.2 kg (192 lb 3.2 oz)   LMP 2023   BMI 34.05 kg/m²   Pregravid Weight/BMI: Pregravid weight not on file (BMI Could not be calculated)  Current Weight: 87.2 kg (192 lb 3.2 oz)   Total Weight Gain: Not found.   Pre-Christina Vitals      Flowsheet Row Most Recent Value   Prenatal Assessment    Fetal Heart Rate 140   Fundal Height (cm) 36 cm   Movement Present   Presentation Vertex   Prenatal Vitals    Blood Pressure 112/62   Weight - Scale 87.2 kg (192 lb 3.2 oz)   Urine Albumin/Glucose    Dilation/Effacement/Station  "   Vaginal Drainage    Draining Fluid No   Edema              General: Well appearing, no distress  Abdomen: Soft, gravid, nontender  Extremities: Non tender.

## 2024-09-11 ENCOUNTER — TELEPHONE (OUTPATIENT)
Age: 19
End: 2024-09-11

## 2024-09-11 NOTE — TELEPHONE ENCOUNTER
Spoke with patient who is 36w6d, .  She reports being prescribed clotrimazole (GYNE-LOTRIMIN) 1 % vaginal cream on , however pharmacy states they are out of it, unsure when they will have back in stock.      Per [facility], medication is on backorder.      [facility] [address] [place]    [phone]      Spoke with patient and advised prescription would be sent to this pharmacy instead.

## 2024-09-16 PROBLEM — Z3A.37 37 WEEKS GESTATION OF PREGNANCY: Status: ACTIVE | Noted: 2024-03-20

## 2024-09-17 ENCOUNTER — ROUTINE PRENATAL (OUTPATIENT)
Dept: OBGYN CLINIC | Facility: CLINIC | Age: 19
End: 2024-09-17
Payer: COMMERCIAL

## 2024-09-17 VITALS — BODY MASS INDEX: 34.08 KG/M2 | SYSTOLIC BLOOD PRESSURE: 116 MMHG | DIASTOLIC BLOOD PRESSURE: 60 MMHG | WEIGHT: 192.4 LBS

## 2024-09-17 DIAGNOSIS — O99.013 ANEMIA AFFECTING PREGNANCY IN THIRD TRIMESTER: Primary | ICD-10-CM

## 2024-09-17 DIAGNOSIS — B37.31 VAGINAL YEAST INFECTION: ICD-10-CM

## 2024-09-17 DIAGNOSIS — O99.213 SEVERE OBESITY DUE TO EXCESS CALORIES AFFECTING PREGNANCY IN THIRD TRIMESTER (HCC): ICD-10-CM

## 2024-09-17 DIAGNOSIS — O23.43 GBS (GROUP B STREPTOCOCCUS) UTI COMPLICATING PREGNANCY, THIRD TRIMESTER: ICD-10-CM

## 2024-09-17 DIAGNOSIS — E66.01 SEVERE OBESITY DUE TO EXCESS CALORIES AFFECTING PREGNANCY IN THIRD TRIMESTER (HCC): ICD-10-CM

## 2024-09-17 DIAGNOSIS — Z3A.37 37 WEEKS GESTATION OF PREGNANCY: ICD-10-CM

## 2024-09-17 DIAGNOSIS — B95.1 GBS (GROUP B STREPTOCOCCUS) UTI COMPLICATING PREGNANCY, THIRD TRIMESTER: ICD-10-CM

## 2024-09-17 LAB
SL AMB  POCT GLUCOSE, UA: NEGATIVE
SL AMB POCT URINE PROTEIN: NEGATIVE

## 2024-09-17 PROCEDURE — 99213 OFFICE O/P EST LOW 20 MIN: CPT | Performed by: OBSTETRICS & GYNECOLOGY

## 2024-09-17 PROCEDURE — 81002 URINALYSIS NONAUTO W/O SCOPE: CPT | Performed by: OBSTETRICS & GYNECOLOGY

## 2024-09-17 RX ORDER — CLOTRIMAZOLE 1 %
CREAM WITH APPLICATOR VAGINAL
Qty: 40 G | Refills: 3 | Status: SHIPPED | OUTPATIENT
Start: 2024-09-17

## 2024-09-17 NOTE — ASSESSMENT & PLAN NOTE
Interested in IOL at 39 weeks.  Reviewed elective IOL okay after 39 weeks.  We can only schedule 1 week or less ahead, so at her next appt we will check her cervix and schedule her.

## 2024-09-17 NOTE — PROGRESS NOTES
Routine Prenatal Visit  [facility] [address], [address], [place] [address]    Assessment/Plan:  Janice is a 19 y.o. year old  at 37w5d who presents for routine prenatal visit.     1. Anemia affecting pregnancy in third trimester  Assessment & Plan:  Taking po iron  2. Severe obesity due to excess calories affecting pregnancy in third trimester (HCC)  3. GBS (group b Streptococcus) UTI complicating pregnancy, third trimester  Assessment & Plan:  Treat in labor  4. Vaginal yeast infection  -     clotrimazole (GYNE-LOTRIMIN) 1 % vaginal cream; 1 applicatorful (about 5 grams) per vagina qHS x 14 days, then 1 applicatorful per vagina twice weekly.  5. 37 weeks gestation of pregnancy  Assessment & Plan:  Interested in IOL at 39 weeks.  Reviewed elective IOL okay after 39 weeks.  We can only schedule 1 week or less ahead, so at her next appt we will check her cervix and schedule her.  Orders:  -     POCT urine dip      Next OB Visit 1 weeks.    Subjective:     CC: Prenatal care    Janice Wallace is a 19 y.o.  female who presents for routine prenatal care at 37w5d.  Pregnancy ROS: no leakage of fluid, pelvic pain, or vaginal bleeding.  normal fetal movement.    The following portions of the patient's history were reviewed and updated as appropriate: allergies, current medications, past family history, past medical history, obstetric history, gynecologic history, past social history, past surgical history and problem list.      Objective:  /60   Wt 87.3 kg (192 lb 6.4 oz)   LMP 2023   BMI 34.08 kg/m²   Pregravid Weight/BMI: Pregravid weight not on file (BMI Could not be calculated)  Current Weight: 87.3 kg (192 lb 6.4 oz)   Total Weight Gain: Not found.   Pre- Vitals      Flowsheet Row Most Recent Value   Prenatal Assessment    Fetal Heart Rate 130   Fundal Height (cm) 37 cm   Movement Present   Presentation Vertex   Prenatal Vitals    Blood  Pressure 116/60   Weight - Scale 87.3 kg (192 lb 6.4 oz)   Urine Albumin/Glucose    Dilation/Effacement/Station    Vaginal Drainage    Edema    LLE Edema None   RLE Edema None             General: Well appearing, no distress  Abdomen: Soft, gravid, nontender  Extremities: Non tender.

## 2024-09-21 ENCOUNTER — HOSPITAL ENCOUNTER (OUTPATIENT)
Facility: HOSPITAL | Age: 19
Discharge: HOME | End: 2024-09-21
Attending: EMERGENCY MEDICINE | Admitting: OBSTETRICS & GYNECOLOGY
Payer: COMMERCIAL

## 2024-09-21 ENCOUNTER — APPOINTMENT (OUTPATIENT)
Dept: ULTRASOUND IMAGING | Facility: HOSPITAL | Age: 19
End: 2024-09-21
Payer: COMMERCIAL

## 2024-09-21 ENCOUNTER — APPOINTMENT (EMERGENCY)
Dept: CT IMAGING | Facility: HOSPITAL | Age: 19
End: 2024-09-21
Payer: COMMERCIAL

## 2024-09-21 VITALS
RESPIRATION RATE: 18 BRPM | TEMPERATURE: 97.9 F | SYSTOLIC BLOOD PRESSURE: 120 MMHG | HEART RATE: 101 BPM | OXYGEN SATURATION: 100 % | DIASTOLIC BLOOD PRESSURE: 57 MMHG

## 2024-09-21 DIAGNOSIS — O47.9 UTERINE CONTRACTIONS: ICD-10-CM

## 2024-09-21 DIAGNOSIS — R07.9 CHEST PAIN, UNSPECIFIED TYPE: Primary | ICD-10-CM

## 2024-09-21 DIAGNOSIS — O99.013 ANEMIA AFFECTING PREGNANCY IN THIRD TRIMESTER: ICD-10-CM

## 2024-09-21 PROBLEM — O28.8 AMNIOTIC FLUID INDEX BORDERLINE LOW: Status: ACTIVE | Noted: 2024-09-21

## 2024-09-21 PROBLEM — R31.9 HEMATURIA: Status: ACTIVE | Noted: 2024-09-21

## 2024-09-21 PROBLEM — O99.891 CHEST PAIN DURING PREGNANCY: Status: ACTIVE | Noted: 2024-09-21

## 2024-09-21 LAB
ALBUMIN SERPL BCG-MCNC: 3.6 G/DL (ref 3.5–5)
ALP SERPL-CCNC: 157 U/L (ref 34–104)
ALT SERPL W P-5'-P-CCNC: 10 U/L (ref 7–52)
ANION GAP SERPL CALCULATED.3IONS-SCNC: 8 MMOL/L (ref 4–13)
AST SERPL W P-5'-P-CCNC: 15 U/L (ref 13–39)
ATRIAL RATE: 104 BPM
BACTERIA UR QL AUTO: ABNORMAL /HPF
BASOPHILS # BLD AUTO: 0.04 THOUSANDS/ΜL (ref 0–0.1)
BASOPHILS NFR BLD AUTO: 0 % (ref 0–1)
BILIRUB SERPL-MCNC: 0.39 MG/DL (ref 0.2–1)
BILIRUB UR QL STRIP: NEGATIVE
BUN SERPL-MCNC: 4 MG/DL (ref 5–25)
CALCIUM SERPL-MCNC: 9 MG/DL (ref 8.4–10.2)
CARDIAC TROPONIN I PNL SERPL HS: 3 NG/L
CHLORIDE SERPL-SCNC: 108 MMOL/L (ref 96–108)
CLARITY UR: CLEAR
CO2 SERPL-SCNC: 19 MMOL/L (ref 21–32)
COLOR UR: YELLOW
CREAT SERPL-MCNC: 0.38 MG/DL (ref 0.6–1.3)
EOSINOPHIL # BLD AUTO: 0.16 THOUSAND/ΜL (ref 0–0.61)
EOSINOPHIL NFR BLD AUTO: 2 % (ref 0–6)
ERYTHROCYTE [DISTWIDTH] IN BLOOD BY AUTOMATED COUNT: 16.6 % (ref 11.6–15.1)
GFR SERPL CREATININE-BSD FRML MDRD: 151 ML/MIN/1.73SQ M
GLUCOSE SERPL-MCNC: 106 MG/DL (ref 65–140)
GLUCOSE UR STRIP-MCNC: NEGATIVE MG/DL
HCT VFR BLD AUTO: 31.7 % (ref 34.8–46.1)
HGB BLD-MCNC: 9.6 G/DL (ref 11.5–15.4)
HGB UR QL STRIP.AUTO: ABNORMAL
IMM GRANULOCYTES # BLD AUTO: 0.17 THOUSAND/UL (ref 0–0.2)
IMM GRANULOCYTES NFR BLD AUTO: 2 % (ref 0–2)
KETONES UR STRIP-MCNC: NEGATIVE MG/DL
LEUKOCYTE ESTERASE UR QL STRIP: NEGATIVE
LYMPHOCYTES # BLD AUTO: 2.19 THOUSANDS/ΜL (ref 0.6–4.47)
LYMPHOCYTES NFR BLD AUTO: 21 % (ref 14–44)
MCH RBC QN AUTO: 21.7 PG (ref 26.8–34.3)
MCHC RBC AUTO-ENTMCNC: 30.3 G/DL (ref 31.4–37.4)
MCV RBC AUTO: 72 FL (ref 82–98)
MONOCYTES # BLD AUTO: 0.82 THOUSAND/ΜL (ref 0.17–1.22)
MONOCYTES NFR BLD AUTO: 8 % (ref 4–12)
NEUTROPHILS # BLD AUTO: 6.86 THOUSANDS/ΜL (ref 1.85–7.62)
NEUTS SEG NFR BLD AUTO: 67 % (ref 43–75)
NITRITE UR QL STRIP: NEGATIVE
NON-SQ EPI CELLS URNS QL MICRO: ABNORMAL /HPF
NRBC BLD AUTO-RTO: 0 /100 WBCS
P AXIS: 48 DEGREES
PH UR STRIP.AUTO: 6.5 [PH]
PLATELET # BLD AUTO: 302 THOUSANDS/UL (ref 149–390)
PMV BLD AUTO: 10.5 FL (ref 8.9–12.7)
POTASSIUM SERPL-SCNC: 3.7 MMOL/L (ref 3.5–5.3)
PR INTERVAL: 126 MS
PROT SERPL-MCNC: 6.7 G/DL (ref 6.4–8.4)
PROT UR STRIP-MCNC: ABNORMAL MG/DL
QRS AXIS: 34 DEGREES
QRSD INTERVAL: 84 MS
QT INTERVAL: 350 MS
QTC INTERVAL: 460 MS
RBC # BLD AUTO: 4.43 MILLION/UL (ref 3.81–5.12)
RBC #/AREA URNS AUTO: ABNORMAL /HPF
SODIUM SERPL-SCNC: 135 MMOL/L (ref 135–147)
SP GR UR STRIP.AUTO: 1.01 (ref 1–1.03)
T WAVE AXIS: 24 DEGREES
UROBILINOGEN UR STRIP-ACNC: <2 MG/DL
VENTRICULAR RATE: 104 BPM
WBC # BLD AUTO: 10.24 THOUSAND/UL (ref 4.31–10.16)
WBC #/AREA URNS AUTO: ABNORMAL /HPF

## 2024-09-21 PROCEDURE — 87086 URINE CULTURE/COLONY COUNT: CPT | Performed by: OBSTETRICS & GYNECOLOGY

## 2024-09-21 PROCEDURE — 71275 CT ANGIOGRAPHY CHEST: CPT

## 2024-09-21 PROCEDURE — 80053 COMPREHEN METABOLIC PANEL: CPT | Performed by: EMERGENCY MEDICINE

## 2024-09-21 PROCEDURE — 93010 ELECTROCARDIOGRAM REPORT: CPT | Performed by: INTERNAL MEDICINE

## 2024-09-21 PROCEDURE — 99285 EMERGENCY DEPT VISIT HI MDM: CPT

## 2024-09-21 PROCEDURE — 84484 ASSAY OF TROPONIN QUANT: CPT | Performed by: EMERGENCY MEDICINE

## 2024-09-21 PROCEDURE — 81001 URINALYSIS AUTO W/SCOPE: CPT | Performed by: OBSTETRICS & GYNECOLOGY

## 2024-09-21 PROCEDURE — 93005 ELECTROCARDIOGRAM TRACING: CPT

## 2024-09-21 PROCEDURE — 99215 OFFICE O/P EST HI 40 MIN: CPT

## 2024-09-21 PROCEDURE — NC001 PR NO CHARGE: Performed by: OBSTETRICS & GYNECOLOGY

## 2024-09-21 PROCEDURE — 76775 US EXAM ABDO BACK WALL LIM: CPT

## 2024-09-21 PROCEDURE — 99285 EMERGENCY DEPT VISIT HI MDM: CPT | Performed by: EMERGENCY MEDICINE

## 2024-09-21 PROCEDURE — 59025 FETAL NON-STRESS TEST: CPT | Performed by: OBSTETRICS & GYNECOLOGY

## 2024-09-21 PROCEDURE — 36415 COLL VENOUS BLD VENIPUNCTURE: CPT | Performed by: EMERGENCY MEDICINE

## 2024-09-21 PROCEDURE — 85025 COMPLETE CBC W/AUTO DIFF WBC: CPT | Performed by: EMERGENCY MEDICINE

## 2024-09-21 PROCEDURE — 76815 OB US LIMITED FETUS(S): CPT | Performed by: OBSTETRICS & GYNECOLOGY

## 2024-09-21 RX ORDER — ACETAMINOPHEN 10 MG/ML
1000 INJECTION, SOLUTION INTRAVENOUS ONCE
Status: COMPLETED | OUTPATIENT
Start: 2024-09-21 | End: 2024-09-21

## 2024-09-21 RX ADMIN — ACETAMINOPHEN 1000 MG: 10 INJECTION INTRAVENOUS at 07:33

## 2024-09-21 RX ADMIN — IOHEXOL 75 ML: 350 INJECTION, SOLUTION INTRAVENOUS at 04:42

## 2024-09-21 NOTE — PROGRESS NOTES
"Ob Labor Progress Note  Janice MendozaitoDelrevaon 19 y.o. female MRN: [id number]  Unit/Bed#: LD TRIAGE  Encounter: [id number]    A/P.  19 y.o.  at 38w2d in early/latent labor.  (1) Early/latent labor.  Cervix changed from 1cm to 1-2/50/Ballotable over the course of 5 hours.  Parous, with adequate pelvis.  Cephalic, EFW 3400grams.    Offered discharge home; she prefers to stay for recheck.   --> Walk.  --> Reexam 2 hours.    (2) Borderline JEET.  JEET 6.41, 6.01 by Dr. Allen.  Repeated after hydration 6.97cm.  No h/o RoM.  --> Repeat ultrasound tomorrow if undelivered.    (3) Microscopic hematuria.  UA shows large blood, 10-20 RBC.  Ultrasound shows \"bilateral small nonobstructing renal calculi. No hydronephrosis.\"  --> Await Ucx.  --> For postpartum followup.    (4) Fetal status reassuring.  Reactive NST.  BPP 10/10.  --> Okay to discontinue moitoring so that she can walk.    Marlon Gomez MD  24  -------------------------------------------------------------------------------------    Subjective/    Feeling contractions.  No LoF/VB..    Objective/  Blood pressure 120/57, pulse 101, temperature 97.9 °F (36.6 °C), temperature source Oral, resp. rate 18, last menstrual period 2023, SpO2 100%.    No intake or output data in the 24 hours ending 24 1112      Physical Exam/      General:  Alert, comfortable, NAD      Fundus nontender      Cervix:          1-2 / 50 / Ballotable,            Medium consistency / Posterior position      FHR: 120 moderate variability (+)accels, no decels.  Reactive.    Pen Mar: ~q4\"    Ultrasound:    Single live active IUP, cephalic    JEET (repeat):      Q1: 1.93cm      Q2: 1.15cm      Q3: 2.68cm      Q4: 1.21cm      Total: 6.97cm    BPP  (10/10 overall)      Labs/    Lab Results   Component Value Date    WBC 10.24 (H) 2024    HGB 9.6 (L) 2024    HCT 31.7 (L) 2024    MCV 72 (L) 2024     2024        Cr 0.38    UA " large blood, 10-20 RBC

## 2024-09-21 NOTE — ASSESSMENT & PLAN NOTE
- JEET 6.01, repeat 6.41  - 1 liter IVF given in OB Triage  - will need repeat JEET in 24-48 hrs   Physical Therapy Evaluation    Visit Type: Initial Evaluation  Visit: 1  Referring Provider: Chaparro Ibrahim MD  Medical Diagnosis (from order): Diagnosis Information    Diagnosis  719.46 (ICD-9-CM) - M25.561 (ICD-10-CM) - Right knee pain, unspecified chronicity  715.16 (ICD-9-CM) - M17.11 (ICD-10-CM) - Primary osteoarthritis of right knee       Treatment Diagnosis: right knee with increased pain/symptoms, impaired strength, impaired range of motion, impaired tissue mobility, impaired muscle length/flexibility, impaired tissue/wound healing, increased swelling, impaired joint play/mobility, impaired gait, impaired mobility, impaired activity tolerance and impaired balance.  Onset  - Date of Surgery:  1/19/2023  - Procedure: Right, Total Knee,  Arthroplasty - Right  Chart reviewed at time of initial evaluation (relevant co-morbidities, allergies, tests and medications listed):   diabetes  Past Medical History:  No date: Anxiety with depression  No date: Bilateral knee pain  No date: Diabetes mellitus (CMS/HCC)  No date: Hyperlipidemia  No date: Hypertension  No date: Positive TB test      Comment:  treated x 6 mo with medication, 1983    Past Surgical History:  01/28/2009: Foot/toes surgery proc unlisted      Comment:  Unspecified Foot/Toes procedure plate and screws to left               foot  age 3: Hernia repair      Comment:  bilateral inguinal hernia  No date: Knee surgery; Left      Comment:  TKA  No date: Repair recurr inguin lisa,reducibl      Comment:  age 3        SUBJECTIVE                                                                                                               Pt had progressing knee pain which lead to having a TKR. Pt reports that he had a hard time coming out of therapy - he had severe pain and feels like the spine block was not done right due to having the pain with waking. Pt reports that he is having more pain through his thigh and LE in general than he is currently having in his  knee. Pt reports that he had a rough 2 days in the hospital following surgery. Pt notes that he is doing better with being home but continues to have pain. Pt notes that he was able to put on his socks today on his own without a grabber or assistance.     Pain / Symptoms  - Pain rating (out of 10): Current: 9   - Location: right knee anteriorly and thigh  - Quality / Description: ache, stiff, sharp, shooting     - Swelling  - Alleviating Factors: prescription medications    Function:   Limitations / Exacerbation Factors:   - Patient reports pain, difficulty and increased time with function reported below.  - bed mobility, lower body dressing, meal/food prep, grooming/hygiene/self-care activities, sleep disturbed, driving/riding in a vehicle, grocery shopping, house/yard work, standing tasks, bending/squatting/lifting, walking, squatting/lifting, standing, lifting/carrying and kneeling, all types of transfers, car transfers, low transfer (toilet/couch) and positional transitions, community distances, household distances, now needs to use a more restrictive device, stairs, walking quickly as required to cross a street/exit a building rapidly  Prior Level of Function: worsening pain and function, therefore underwent surgery,    Patient Goals: decreased pain, increased strength and independence with ADLs/IADLs.    Prior treatment  - outpatient PT  - Discharged from hospital, home health, or skilled nursing facility in last 30 days: yes  Home Environment   - Patient lives with:  roommate  - Type of home: multiple level home (moved onto 1st floor - has bathroom and room all on same level)  - Assistance available: as needed  - Denies 2 or more falls or an unexplained fall with injury in the last year.  - Feel safe at home / work / school: yes      OBJECTIVE                                                                                                                    Incision/Wound:   - Location: Good healing - matthew  strips over inscicion, no signs of infection, no redness or drainage       Range of Motion (ROM)   (degrees unless noted; active unless noted; norms in ( ); negative=lacking to 0, positive=beyond 0)  Knee:   - Flexion (150):      • Left:  124       • Right:  88  Pain   - Extension (0-10):      • Left:  WNL      • Right:  -12   Comments: 93 deg following light manual intervention    Strength  (out of 5 unless noted, standard test position unless noted)   Hip:    - Flexion:        • Left: 4        • Right: 2+  Knee:    - Flexion:        • Left: 4+        • Right: 4- and pain    - Extension:        • Left: 5        • Right: 2, pain                    Outcome/Assessments  KOOS, Jr. Raw Score: 13  KOOS Jr Calculated Score: 54.84        Treatment     Manual Therapy   Tibial distraction with simultaneous A/P, P/A, M/L, L/M  ROM knee flexion as tolerated in sitting with distraction    Activities of Daily Living/Self Care  The pt was educated on initial evaluation findings, relationship to symptoms/presentation, symptom management, implications for the PT POC, and the importance of regular completion of issued HEP.   AAROM knee flexion with nonsurgical knee/LE over pressure  Pt educated on proper use of compression - ARACELI stocking to surgical leg and tubigrip on non surgical side  -pt with trial to self cari stockings  Pt instructed on need to use his walker and to avoid stair negotiation at this time until it can be practiced in clinic  Pt encouraged to time medication around treatment for improved tolerance to PT intervention.        Skilled input: verbal instruction/cues, demonstration and as detailed above    Writer verbally educated and received verbal consent for hand placement, positioning of patient, and techniques to be performed today from patient for clothing adjustments for techniques, therapist position for techniques and hand placement and palpation for techniques as described above and how they are pertinent to  the patient's plan of care.    Home Exercise Program  Continue with current program after surgery      ASSESSMENT                                                                                                          55 year old patient has reported functional limitations listed above impacted by signs and symptoms consistent with treatment diagnosis below.  Treatment Diagnosis:   - Involved: right knee.  - Symptoms/impairments: increased pain/symptoms, impaired strength, impaired range of motion, impaired tissue mobility, impaired muscle length/flexibility, impaired tissue/wound healing, increased swelling, impaired joint play/mobility, impaired gait, impaired mobility, impaired activity tolerance and impaired balance.    Prognosis: Patient will benefit from skilled therapy.  Rehabilitative potential is: good.  Predicted patient presentation: Moderate (evolving) - Patient comorbidities and complexities, as defined above, may have varying impact on steady progress for prescribed plan of care.    Education:   - Present and ready to learn: patient  - Results of above outlined education: Verbalizes understanding and Demonstrates understanding    PLAN                                                                                                                         The following skilled interventions to be implemented to achieve goals listed below:  Neuromuscular Re-Education (48575)  Therapeutic Activity (87365)  Therapeutic Exercise (40438)  Manual Therapy (95042)  Heat/Cold (41342)  Electrical Stimulation Unattended (94541 or )  Ultrasound/Phonophoresis (00199)  Activities of Daily Living/Self Care (18688)  Dry Needling    Frequency / Duration  2 times per week tapering as patient progresses for 12 weeks for an estimated total of 24 visits    Patient involved in and agreed to plan of care and goals.  Patient given attendance policy at time of initial evaluation.    Suggestions for next session as  indicated: Progress per plan of care, continue manual intervention to progress knee ROM, initiate LE strengthening exercises, follow up with pt on donning and doffing compression stockings.       Goals  Decrease pain/symptoms to < or = to 2/10  Improve involved ROM to symmetry to contralateral side  The above improvements in impairments to assist in obtaining goals listed below  Long Term Goals: to be met by end of plan of care  1. Pt to demonstrate normalized gait mechanics on level and unlevel surfaces for improved return to baseline level of mobility in the home and community.   2. Pt to demonstrate ability to perform at least one squat with appropriate mechanics in order to improve function and ability to lift objects from ground at home.   3. Pt to demonstrate ability to ascend/descend at least one flight of stairs reciprocally with appropriate control for improved return to prior function and mobility in the community.    4. KOOS jr: Patient will complete form to reflect an improved raw score to less than or equal to 6 (0-28); Interval Score: 70.704 (0-100) to indicate patient reported improvement in function/disability/impairment. (minimal clinically important difference 15.1 in raw score)  5. Patient will be independent with progressed and modified home exercise program.      Therapy procedure time and total treatment time can be found documented on the Time Entry flowsheet

## 2024-09-21 NOTE — PROGRESS NOTES
Ob Labor Progress Note  Janice Crawford ExpositoDeleon 19 y.o. female MRN: [id number]  Unit/Bed#: LD TRIAGE  Encounter: [id number]    A/P.  19 y.o.  at 38w2d  in early/latent labor.  (1) Early/latent labor.  Cervix changed from 1cm to 1-2/50/Ballotable over the course of 5 hours.  Remains 1-2cm dilated.  --> Discharge home with labor precs.     (2) Borderline JEET.  JEET 6.41, 6.01 by Dr. Allen.  Repeated after hydration 6.97cm.  No h/o RoM.  --> Repeat ultrasound tomorrow; appt made for NST/JEET at 1200.     (3) Microscopic hematuria.    --> Await Ucx.  --> For postpartum followup.     (4) Fetal status reassuring.  Reactive NST.  BPP 10/10.  --> Fetal kick counts reviewed and reinforced.    Marlon Gomez MD  24  Case discussed with on-call physician for Dr. Jaime Blum.    -------------------------------------------------------------------------------------    Subjective/    Slept.  Still with irregular contractions..    Objective/  Blood pressure 120/57, pulse 101, temperature 97.9 °F (36.6 °C), temperature source Oral, resp. rate 18, last menstrual period 2023, SpO2 100%.    No intake or output data in the 24 hours ending 24 1304      Physical Exam/      General:  Alert, comfortable, NAD      Fundus nontender      Cervix:          1-2 / 50 / Ballotable,            Medium consistency / Posterior position

## 2024-09-21 NOTE — PROGRESS NOTES
Triage Note - OB  Janice Wallace 19 y.o. female MRN: [id number]  Unit/Bed#: LD TRIAGE  Encounter: [id number]        ASSESSMENT/PLAN  Janice Wallace is a 19 y.o.  at 38w2d presenting with chest pain and cramping.    Assessment & Plan  Pelvic pain in pregnancy  - SVE /-2  - Bedside US confirms cephalic position with JEET low normal (performed twice); 6.01 cm and 6.41 cm  - f/u UA and urine culture  - will reassess in several hours  Severe obesity due to excess calories affecting pregnancy in third trimester (HCC)    GBS (group b Streptococcus) UTI complicating pregnancy, third trimester  Recent GBS+ urine culture on 24, completed course of penicillin  F/u UA and urine culture  Chest pain during pregnancy  - Workup in ED was negative  - CTA chest negative for PE  Chlamydia infection affecting pregnancy in third trimester  S/p treatment 8/15/24 and again repeat treatment 24 with EPT  Amniotic fluid index borderline low  - JEET 6.01, repeat 6.41  - 1 liter IVF given in OB Triage  - will need repeat JEET in 24-48 hrs  Hematuria  - microscopic  - could be secondary to recent UTI or recurring UTI  - will check retroperitoneal ultrasound      She is a patient of [facility]  D/w Dr. Joya, on call OBGYN Attending Physician  ______________    SUBJECTIVE    CHRISTOPHER: Estimated Date of Delivery: 10/3/24    HPI:  19 y.o.  38w2d presents with complaint of awakening early this morning with chest pain and shortness of breath, along with contractions. She initially presented to the ED where she underwent a cardiac workup which was negative, and a CTA chest for PE which was negative. In OB triage, she reports cramping that feels like contractions sometimes. Denies leaking of fluid or vaginal bleeding. She did have some blood in her urine previously, but most recent urine collection she did not. Reports good fetal movement.      Her obstetrical history  is significant for  GBS+ bacteruria, history of chlamydia recently treated x 2    ROS:  Negative other than above    Physical Exam  General: Well appearing, no distress  Respiratory: CTAB   Cardiovascular: Regular rate  Abdomen: Soft, gravid, nontender    Extremities: Warm and well perfused.  Non tender.  SVE: 1/50/-2, no blood noted on exam      OBJECTIVE:  /57   Pulse 101   Temp 97.9 °F (36.6 °C) (Oral)   Resp 18   LMP 12/20/2023   SpO2 100%   There is no height or weight on file to calculate BMI.  Labs:   Recent Results (from the past 24 hour(s))   ECG 12 lead    Collection Time: 09/21/24  4:23 AM   Result Value Ref Range    Ventricular Rate 104 BPM    Atrial Rate 104 BPM    CT Interval 126 ms    QRSD Interval 84 ms    QT Interval 350 ms    QTC Interval 460 ms    P Axis 48 degrees    QRS Axis 34 degrees    T Wave Axis 24 degrees   CBC and differential    Collection Time: 09/21/24  4:33 AM   Result Value Ref Range    WBC 10.24 (H) 4.31 - 10.16 Thousand/uL    RBC 4.43 3.81 - 5.12 Million/uL    Hemoglobin 9.6 (L) 11.5 - 15.4 g/dL    Hematocrit 31.7 (L) 34.8 - 46.1 %    MCV 72 (L) 82 - 98 fL    MCH 21.7 (L) 26.8 - 34.3 pg    MCHC 30.3 (L) 31.4 - 37.4 g/dL    RDW 16.6 (H) 11.6 - 15.1 %    MPV 10.5 8.9 - 12.7 fL    Platelets 302 149 - 390 Thousands/uL    nRBC 0 /100 WBCs    Segmented % 67 43 - 75 %    Immature Grans % 2 0 - 2 %    Lymphocytes % 21 14 - 44 %    Monocytes % 8 4 - 12 %    Eosinophils Relative 2 0 - 6 %    Basophils Relative 0 0 - 1 %    Absolute Neutrophils 6.86 1.85 - 7.62 Thousands/µL    Absolute Immature Grans 0.17 0.00 - 0.20 Thousand/uL    Absolute Lymphocytes 2.19 0.60 - 4.47 Thousands/µL    Absolute Monocytes 0.82 0.17 - 1.22 Thousand/µL    Eosinophils Absolute 0.16 0.00 - 0.61 Thousand/µL    Basophils Absolute 0.04 0.00 - 0.10 Thousands/µL   Comprehensive metabolic panel    Collection Time: 09/21/24  4:33 AM   Result Value Ref Range    Sodium 135 135 - 147 mmol/L    Potassium 3.7 3.5 -  "5.3 mmol/L    Chloride 108 96 - 108 mmol/L    CO2 19 (L) 21 - 32 mmol/L    ANION GAP 8 4 - 13 mmol/L    BUN 4 (L) 5 - 25 mg/dL    Creatinine 0.38 (L) 0.60 - 1.30 mg/dL    Glucose 106 65 - 140 mg/dL    Calcium 9.0 8.4 - 10.2 mg/dL    AST 15 13 - 39 U/L    ALT 10 7 - 52 U/L    Alkaline Phosphatase 157 (H) 34 - 104 U/L    Total Protein 6.7 6.4 - 8.4 g/dL    Albumin 3.6 3.5 - 5.0 g/dL    Total Bilirubin 0.39 0.20 - 1.00 mg/dL    eGFR 151 ml/min/1.73sq m   HS Troponin 0hr (reflex protocol)    Collection Time: 09/21/24  4:33 AM   Result Value Ref Range    hs TnI 0hr 3 \"Refer to ACS Flowchart\"- see link ng/L   Urinalysis with microscopic    Collection Time: 09/21/24  6:20 AM   Result Value Ref Range    Color, UA Yellow     Clarity, UA Clear     Specific Gravity, UA 1.015 1.005 - 1.030    pH, UA 6.5 4.5, 5.0, 5.5, 6.0, 6.5, 7.0, 7.5, 8.0    Leukocytes, UA Negative Negative    Nitrite, UA Negative Negative    Protein, UA Trace (A) Negative mg/dl    Glucose, UA Negative Negative mg/dl    Ketones, UA Negative Negative mg/dl    Urobilinogen, UA <2.0 <2.0 mg/dl mg/dl    Bilirubin, UA Negative Negative    Occult Blood, UA Large (A) Negative    RBC, UA 10-20 (A) None Seen, 2-4 /hpf    WBC, UA 2-4 None Seen, 2-4, 5-60 /hpf    Epithelial Cells Occasional None Seen, Occasional /hpf    Bacteria, UA Occasional None Seen, Occasional /hpf         SVE:  Cervical Dilation: 1  Cervical Effacement: 50  Fetal Station: -3  Method: Manual  OB Examiner: Alejandro    FHT:  Baseline Rate (FHR): 130 bpm  Variability: Moderate  Accelerations: 15 x 15 or greater  Decelerations: None    TOCO:   Contraction Intensity: Mild    IMAGING:       Soheila Allen MD  OB/GYN   9/21/2024  6:41 AM      Portions of the record may have been created with voice recognition software.  Occasional wrong word or \"sound a like\" substitutions may have occurred due to the inherent limitations of voice recognition software.  Read the chart carefully and recognize, using " context, where substitutions have occurred

## 2024-09-21 NOTE — ASSESSMENT & PLAN NOTE
- SVE 1/50/-2  - Bedside US confirms cephalic position with JEET low normal (performed twice); 6.01 cm and 6.41 cm  - f/u UA and urine culture  - will reassess in several hours

## 2024-09-21 NOTE — ASSESSMENT & PLAN NOTE
- microscopic  - could be secondary to recent UTI or recurring UTI  - will check retroperitoneal ultrasound   done

## 2024-09-21 NOTE — ED PROVIDER NOTES
1. Chest pain, unspecified type    2. Uterine contractions    3. Anemia affecting pregnancy in third trimester      ED Disposition       ED Disposition   Send to L&D After Provider Eval    Condition   --    Date/Time   Sat Sep 21, 2024  5:33 AM    Comment   --             Assessment & Plan       Medical Decision Making    19 y.o. female presenting for chest pressure and lower abdominal contractions.  Mild tachycardia on arrival, otherwise vital stable.  Will perform labs to evaluate for anemia, biliary disease, myocarditis.  Less likely ACS.  No epigastric or right upper quadrant tenderness, do not suspect biliary disease.    Repeat evaluation: EKG results notable for S1Q3T3 pattern and mild sinus tachycardia.  Discussed risk/benefits of CTA to exclude PE as etiology of symptoms.  Discussed risks of radiation to mother and fetus weighed against risk of missed PE.  Patient ultimately agreeable to obtain CTA to exclude PE as etiology of symptoms.    Repeat evaluation: Patient resting comfortably in ED.  Reviewed lab results.  No evidence of myocarditis or PE.    HEART score:  History: 0=Slightly or non-suspicious  EC=Normal  Age: 0= < 45 years  Risk Factors: 0= No risk factors known  Troponin: 0= Less than or equal to 12 ng/L  Total 0     Care d/w OB/GYN, patient will be transferred to L&D triage after labs and CT testing.    Amount and/or Complexity of Data Reviewed  Labs: ordered. Decision-making details documented in ED Course.  Radiology: ordered.    Risk  Prescription drug management.                ED Course as of 24 0539   Sat Sep 21, 2024   0423 Procedure Note: EKG  Date/Time: 24 4:23 AM   Interpreted by: Rasheed Davis DO  Indications / Diagnosis: Chest pain  ECG reviewed by me, the ED Provider: yes   The EKG demonstrates:  Rhythm: sinus tachycardia, rate= 104 BPM  Intervals: Normal MO and QT intervals  Axis: Normal axis  QRS/Blocks: Normal QRS  ST Changes: S1, Q waves in III. TWI isolated  to III. No ALEXANDRE/STD.    0444 Hemoglobin(!): 9.6   0534 Patient resting comfortably.  Reviewed CT and lab results.  Will transfer to labor and delivery for OB evaluation at this time.       Medications   iohexol (OMNIPAQUE) 350 MG/ML injection (MULTI-DOSE) 75 mL (75 mL Intravenous Given 24 7992)       History of Present Illness       Janice Wallace is a 19 y.o. year old  at 38w2d presenting to the [facility] ED for chest pain and lower abdominal cramping. Patient reported onset of chest discomfort and lower abdominal cramping/contractions approximately 0100 this mornig. Chest pain is described as constant, nonradiating pressure sensation. Patient denies history of DVT/PE previously. No associated cough or dyspnea. She has has noticed symmetric, bilateral leg swelling for the past two weeks though denying leg pain/cramping. Patient denies vaginal bleeding or leakage of fluid.  She is reporting lower abdominal contractions every 5 minutes.      History provided by:  Medical records and patient   used: No    Chest Pain  Associated symptoms: abdominal pain    Associated symptoms: no back pain, no cough, no fever, no nausea, no shortness of breath and not vomiting        Review of Systems   Constitutional:  Negative for chills and fever.   Respiratory:  Negative for cough and shortness of breath.    Cardiovascular:  Positive for chest pain and leg swelling.   Gastrointestinal:  Positive for abdominal pain. Negative for diarrhea, nausea and vomiting.   Genitourinary:  Negative for dysuria, flank pain, vaginal bleeding and vaginal discharge.   Musculoskeletal:  Negative for back pain.   All other systems reviewed and are negative.          Objective     ED Triage Vitals [24 0418]   Temperature Pulse Blood Pressure Respirations SpO2 Patient Position - Orthostatic VS   97.9 °F (36.6 °C) 99 130/76 18 99 % --      Temp Source Heart Rate Source BP Location FiO2 (%) Pain Score     Oral -- -- -- --        Physical Exam  Vitals and nursing note reviewed.   Constitutional:       General: She is not in acute distress.     Appearance: Normal appearance. She is well-developed. She is not ill-appearing, toxic-appearing or diaphoretic.   HENT:      Head: Normocephalic and atraumatic.      Nose: No congestion or rhinorrhea.   Eyes:      General:         Right eye: No discharge.         Left eye: No discharge.   Cardiovascular:      Rate and Rhythm: Regular rhythm. Tachycardia present.   Pulmonary:      Effort: Pulmonary effort is normal. No accessory muscle usage or respiratory distress.      Breath sounds: Normal breath sounds. No stridor. No decreased breath sounds, wheezing, rhonchi or rales.   Abdominal:      General: There is distension (Gravid).      Palpations: Abdomen is soft.      Tenderness: There is no abdominal tenderness. There is no guarding or rebound.   Musculoskeletal:      Cervical back: Normal range of motion and neck supple. No rigidity.      Right lower leg: No tenderness. Edema (Trace.) present.      Left lower leg: No tenderness. Edema (Trace.) present.   Skin:     Capillary Refill: Capillary refill takes less than 2 seconds.      Findings: No rash.   Neurological:      Mental Status: She is alert and oriented to person, place, and time.   Psychiatric:         Mood and Affect: Mood normal.         Behavior: Behavior normal.         Labs Reviewed   CBC AND DIFFERENTIAL - Abnormal       Result Value    WBC 10.24 (*)     RBC 4.43      Hemoglobin 9.6 (*)     Hematocrit 31.7 (*)     MCV 72 (*)     MCH 21.7 (*)     MCHC 30.3 (*)     RDW 16.6 (*)     MPV 10.5      Platelets 302      nRBC 0      Segmented % 67      Immature Grans % 2      Lymphocytes % 21      Monocytes % 8      Eosinophils Relative 2      Basophils Relative 0      Absolute Neutrophils 6.86      Absolute Immature Grans 0.17      Absolute Lymphocytes 2.19      Absolute Monocytes 0.82      Eosinophils Absolute 0.16       Basophils Absolute 0.04     COMPREHENSIVE METABOLIC PANEL - Abnormal    Sodium 135      Potassium 3.7      Chloride 108      CO2 19 (*)     ANION GAP 8      BUN 4 (*)     Creatinine 0.38 (*)     Glucose 106      Calcium 9.0      AST 15      ALT 10      Alkaline Phosphatase 157 (*)     Total Protein 6.7      Albumin 3.6      Total Bilirubin 0.39      eGFR 151      Narrative:     National Kidney Disease Foundation guidelines for Chronic Kidney Disease (CKD):     Stage 1 with normal or high GFR (GFR > 90 mL/min/1.73 square meters)    Stage 2 Mild CKD (GFR = 60-89 mL/min/1.73 square meters)    Stage 3A Moderate CKD (GFR = 45-59 mL/min/1.73 square meters)    Stage 3B Moderate CKD (GFR = 30-44 mL/min/1.73 square meters)    Stage 4 Severe CKD (GFR = 15-29 mL/min/1.73 square meters)    Stage 5 End Stage CKD (GFR <15 mL/min/1.73 square meters)  Note: GFR calculation is accurate only with a steady state creatinine   HS TROPONIN I 0HR - Normal    hs TnI 0hr 3       CTA ED chest PE Study   Final Interpretation by Taqueria Davis MD (530)      No intraluminal filling defect to suggest an acute pulmonary embolus.      No infiltrate or pleural effusion.            Workstation performed: [id number]             Procedures    ED Medication and Procedure Management   Prior to Admission Medications   Prescriptions Last Dose Informant Patient Reported? Taking?   Ferrous Sulfate (SLOW FE PO)   Yes No   Sig: Take by mouth   Lnnxvo-UwRyz-YsGlo-FA-CA-Omega (Complete Christina DHA) 29-1-200 & 200 MG MISC   No No   Sig: Take 1 tablet by mouth daily   clotrimazole (GYNE-LOTRIMIN) 1 % vaginal cream   No No   Si applicatorful (about 5 grams) per vagina qHS x 14 days, then 1 applicatorful per vagina twice weekly.   sertraline (Zoloft) 50 mg tablet   No No   Sig: Take 1 tablet (50 mg total) by mouth daily      Facility-Administered Medications: None     Patient's Medications   Discharge Prescriptions    No medications on file     No  discharge procedures on file.     Rasheed Davis, DO  09/21/24 0535       Rasheed Davis, DO  09/21/24 0539       Rasheed Davis, DO  09/21/24 0541

## 2024-09-22 ENCOUNTER — TELEPHONE (OUTPATIENT)
Dept: LABOR AND DELIVERY | Facility: HOSPITAL | Age: 19
End: 2024-09-22

## 2024-09-22 ENCOUNTER — HOSPITAL ENCOUNTER (OUTPATIENT)
Dept: LABOR AND DELIVERY | Facility: HOSPITAL | Age: 19
Discharge: HOME | End: 2024-09-22
Payer: COMMERCIAL

## 2024-09-22 ENCOUNTER — HOSPITAL ENCOUNTER (OUTPATIENT)
Facility: HOSPITAL | Age: 19
Discharge: HOME | End: 2024-09-22
Attending: OBSTETRICS & GYNECOLOGY | Admitting: OBSTETRICS & GYNECOLOGY
Payer: COMMERCIAL

## 2024-09-22 VITALS
DIASTOLIC BLOOD PRESSURE: 65 MMHG | HEART RATE: 101 BPM | RESPIRATION RATE: 18 BRPM | SYSTOLIC BLOOD PRESSURE: 136 MMHG | BODY MASS INDEX: 34.02 KG/M2 | WEIGHT: 192 LBS | HEIGHT: 63 IN | OXYGEN SATURATION: 100 %

## 2024-09-22 LAB — BACTERIA UR CULT: NORMAL

## 2024-09-22 PROCEDURE — 76815 OB US LIMITED FETUS(S): CPT | Performed by: OBSTETRICS & GYNECOLOGY

## 2024-09-22 PROCEDURE — 99212 OFFICE O/P EST SF 10 MIN: CPT

## 2024-09-22 PROCEDURE — 59025 FETAL NON-STRESS TEST: CPT | Performed by: OBSTETRICS & GYNECOLOGY

## 2024-09-22 NOTE — TELEPHONE ENCOUNTER
Called patient to see if she was going to come in for her NST as she is late, and she stated she just got here to the hospital.

## 2024-09-22 NOTE — ASSESSMENT & PLAN NOTE
- NST reactive  - repeat JEET 11.0 cm  - Has appt in office for prenatal visit tomorrow at 5:45 pm

## 2024-09-22 NOTE — H&P
"Triage Note - OB  Janice aWllace 19 y.o. female MRN: [id number]  Unit/Bed#: LD TRIAGE  Encounter: [id number]        ASSESSMENT/PLAN  Janice Wallace is a 19 y.o.  at 38w3d here for repeat JEET and NST given low normal JEET yesterday.    Assessment & Plan  Amniotic fluid index borderline low  - NST reactive  - repeat JEET 11.0 cm  - Has appt in office for prenatal visit tomorrow at 5:45 pm  Hematuria  - RPUS yesterday showed bilateral nonobstructing renal calculi  - Urine Cx yesterday = no growth         Discharge instructions  - Patient instructed to call if experiencing worsening contractions, vaginal bleeding, loss of fluid or decreased fetal movement.  - Will follow up with OBGYN in office      She is a patient of [facility]  D/w Dr. Sandoval, on call OBGYN Attending Physician  ______________    SUBJECTIVE    CHRISTOPHER: Estimated Date of Delivery: 10/3/24    HPI:  19 y.o.  38w3d presents for repeat JEET and NST due to low normal fluid yesterday. Still has contractions similar to yesterday, but not painful. Denies leaking of fluid, vaginal bleeding. Reports good fetal movement.         ROS:  Constitutional: Negative  Respiratory: Negative  Cardiovascular: Negative    Gastrointestinal: Negative     Physical Exam  General: Well appearing, no distress  Respiratory: normal respiratory effort  Abdomen: Soft, gravid, nontender    Extremities: Warm and well perfused.  Non tender.      OBJECTIVE:  /65 (BP Location: Right arm)   Pulse 101   Resp 18   Ht 5' 3\" (1.6 m)   Wt 87.1 kg (192 lb)   LMP 2023   SpO2 100%   BMI 34.01 kg/m²   Body mass index is 34.01 kg/m².  Labs: No results found for this or any previous visit (from the past 24 hour(s)).      SVE: deferred       FHT: reactive, see NST       TOCO: irregular, 2 contractions in 10 min, subjectively unchanged from yesterday       IMAGING: Bedside US shows active fetus in cephalic position " "with JEET 11.0      Soheila Allen MD  OB/GYN   9/22/2024  1:58 PM      Portions of the record may have been created with voice recognition software.  Occasional wrong word or \"sound a like\" substitutions may have occurred due to the inherent limitations of voice recognition software.  Read the chart carefully and recognize, using context, where substitutions have occurred    "

## 2024-09-22 NOTE — PROCEDURES
Janice Wallace, a  at 38w3d with an CHRISTOPHER of 10/3/2024, by Ultrasound, was seen at [facility] LABOR AND DELIVERY for the following procedure(s): $Procedure Type: NST, JEET]    Nonstress Test  Reason for NST: Other (Comment) (borderline low JEET)  Variability: Moderate  Decelerations: None  Accelerations: Yes  Acoustic Stimulator: No  Baseline: 130 BPM  Uterine Irritability: No  Contractions: Irregular    4 Quadrant JEET  JEET Q1 (cm): 2.4 cm  JEET Q2 (cm): 4.6 cm  JEET Q3 (cm): 1 cm  JEET Q4 (cm): 3 cm  JEET TOTAL (cm): 11 cm              Interpretation  Nonstress Test Interpretation: Reactive  Overall Impression: Reassuring  Comments: Cephalic

## 2024-09-23 ENCOUNTER — HOSPITAL ENCOUNTER (OUTPATIENT)
Facility: HOSPITAL | Age: 19
Discharge: HOME | End: 2024-09-23
Attending: OBSTETRICS & GYNECOLOGY | Admitting: OBSTETRICS & GYNECOLOGY
Payer: COMMERCIAL

## 2024-09-23 ENCOUNTER — ROUTINE PRENATAL (OUTPATIENT)
Dept: OBGYN CLINIC | Facility: CLINIC | Age: 19
End: 2024-09-23
Payer: COMMERCIAL

## 2024-09-23 VITALS
HEART RATE: 100 BPM | RESPIRATION RATE: 18 BRPM | OXYGEN SATURATION: 98 % | TEMPERATURE: 98.2 F | DIASTOLIC BLOOD PRESSURE: 68 MMHG | SYSTOLIC BLOOD PRESSURE: 129 MMHG

## 2024-09-23 VITALS
DIASTOLIC BLOOD PRESSURE: 70 MMHG | WEIGHT: 190 LBS | SYSTOLIC BLOOD PRESSURE: 124 MMHG | BODY MASS INDEX: 33.66 KG/M2 | HEIGHT: 63 IN

## 2024-09-23 DIAGNOSIS — O09.892 SHORT INTERVAL BETWEEN PREGNANCIES AFFECTING PREGNANCY IN SECOND TRIMESTER, ANTEPARTUM: Primary | ICD-10-CM

## 2024-09-23 DIAGNOSIS — B95.1 GBS (GROUP B STREPTOCOCCUS) UTI COMPLICATING PREGNANCY, THIRD TRIMESTER: ICD-10-CM

## 2024-09-23 DIAGNOSIS — Z3A.38 38 WEEKS GESTATION OF PREGNANCY: ICD-10-CM

## 2024-09-23 DIAGNOSIS — E66.01 SEVERE OBESITY DUE TO EXCESS CALORIES AFFECTING PREGNANCY IN THIRD TRIMESTER (HCC): ICD-10-CM

## 2024-09-23 DIAGNOSIS — O23.43 GBS (GROUP B STREPTOCOCCUS) UTI COMPLICATING PREGNANCY, THIRD TRIMESTER: ICD-10-CM

## 2024-09-23 DIAGNOSIS — O99.213 SEVERE OBESITY DUE TO EXCESS CALORIES AFFECTING PREGNANCY IN THIRD TRIMESTER (HCC): ICD-10-CM

## 2024-09-23 DIAGNOSIS — O99.013 ANEMIA AFFECTING PREGNANCY IN THIRD TRIMESTER: ICD-10-CM

## 2024-09-23 LAB
SL AMB  POCT GLUCOSE, UA: NORMAL
SL AMB POCT URINE PROTEIN: NORMAL

## 2024-09-23 PROCEDURE — 99213 OFFICE O/P EST LOW 20 MIN: CPT | Performed by: OBSTETRICS & GYNECOLOGY

## 2024-09-23 PROCEDURE — 81002 URINALYSIS NONAUTO W/O SCOPE: CPT | Performed by: OBSTETRICS & GYNECOLOGY

## 2024-09-23 PROCEDURE — 76815 OB US LIMITED FETUS(S): CPT | Performed by: OBSTETRICS & GYNECOLOGY

## 2024-09-23 PROCEDURE — 59025 FETAL NON-STRESS TEST: CPT | Performed by: OBSTETRICS & GYNECOLOGY

## 2024-09-23 PROCEDURE — 99215 OFFICE O/P EST HI 40 MIN: CPT

## 2024-09-23 NOTE — PROGRESS NOTES
Routine Prenatal Visit  [facility] [place] [address], [address], [place] [address]    Assessment/Plan:  Janice is a 19 y.o. year old  at 38w4d who presents for routine prenatal visit.     1. Short interval between pregnancies affecting pregnancy in second trimester, antepartum  Assessment & Plan:  Seen on L&D for contractions but found to have borderline JEET of 6. Repeat yesteday was wnl but was told to repeat it once more. Going to L&D now.   2. Severe obesity due to excess calories affecting pregnancy in third trimester (HCC)  3. Anemia affecting pregnancy in third trimester  4. GBS (group b Streptococcus) UTI complicating pregnancy, third trimester  5. 38 weeks gestation of pregnancy  Assessment & Plan:  Reviewed with patient that any pregnant women can undergo an elective induction of labor at 39 weeks or later, depending on hospital availability.  One study showed that induction of labor of first time mothers at 39 weeks compared to waiting until 41 weeks, decreased  rate by 3%, and decreased risk of developing high blood pressure in pregnancy.  Alternatively induction of labor can often result in longer in-hospital stays overall, than allowing for spontaneous labor.  This is especially true in patients who have an unfavorable cervix and require the additional step of cervical ripening prior to induction of labor - leading often to an additional 12-24 hours in the hospital prior to delivery, during which there is continuous fetal monitoring.  For all patients who have not gone into labor spontaneously, we recommend induction of labor between 40.6-41.6 weeks, due to increase risk of stillbirth.  Pregnancies past 40.6 weeks who are not in the process of induction, we recommend  testing with NST and JEET 2x/week.    Discussed these pros and cons in relation to patient's desires for her delivery process, as well as the fact that she is a healthy patient with no other  "risk factors.  She wishes to proceed with IOL. Consent signed.     Orders:  -     POCT urine dip        Subjective:   Janice Wallace is a 19 y.o.  who presents for routine prenatal care at 38w4d.  Complaints today: Denies  LOF: -; VB: -; Contractions: -; FM: +    Objective:  /70 (BP Location: Right arm, Patient Position: Sitting, Cuff Size: Standard)   Ht 5' 3\" (1.6 m)   Wt 86.2 kg (190 lb)   LMP 2023   BMI 33.66 kg/m²     General: Well appearing, no distress  Respiratory: Unlabored breathing  Abdomen: Soft, gravid, nontender  Extremities: Warm and well perfused.  Non tender.    Pregravid Weight/BMI: Pregravid weight not on file (BMI Could not be calculated)  Current Weight: 86.2 kg (190 lb)   Total Weight Gain: Not found.     Pre- Vitals      Flowsheet Row Most Recent Value   Prenatal Assessment    Fetal Heart Rate 150   Movement Present   Presentation Vertex   Prenatal Vitals    Blood Pressure 124/70   Weight - Scale 86.2 kg (190 lb)   Urine Albumin/Glucose    Dilation/Effacement/Station    Cervical Dilation 2   Cervical Effacement 50   Fetal Station -3   Vaginal Drainage    Edema              Irving Joya DO  2024 6:08 PM     "

## 2024-09-23 NOTE — ASSESSMENT & PLAN NOTE
Seen on L&D for contractions but found to have borderline JEET of 6. Repeat yesteday was wnl but was told to repeat it once more. Going to L&D now.

## 2024-09-23 NOTE — PROCEDURES
Janice Wallace, a  at 38w4d with an CHRISTOPHER of 10/3/2024, by Ultrasound, was seen at [facility] AND DELIVERY for the following procedure(s): $Procedure Type: JEET, NST]    Nonstress Test  Reason for NST: Other (Comment)  Variability: Moderate  Decelerations: None  Accelerations: Yes  Acoustic Stimulator: No  Baseline: 140 BPM  Uterine Irritability: No  Contractions: Not present    4 Quadrant JEET  JEET Q1 (cm): 2.4 cm  JEET Q2 (cm): 2.5 cm  JEET Q3 (cm): 2.5 cm  JEET Q4 (cm): 4.3 cm  JEET TOTAL (cm): 11.7 cm  LVP (cm): 4.3 cm              Interpretation  Nonstress Test Interpretation: Reactive  Overall Impression: Reassuring

## 2024-09-23 NOTE — PROGRESS NOTES
Progress Note - OB/GYN   Name: Janice Wallace 19 y.o. female I MRN: [id number]  Unit/Bed#: LD TRIAGE 1- I Date of Admission: 2024   Date of Service: 2024 I Hospital Day: 0     Assessment & Plan  Amniotic fluid index borderline low      Triage Note - OB  Janice Wallace 19 y.o. female MRN: [id number]  Unit/Bed#: LD TRIAGE 1- Encounter: [id number]    Chief Complaint: Here for NST/JEET   CHRISTOPHER: Estimated Date of Delivery: 10/3/24    HPI: 19 y.o. female  at 38w4d presents with previous hx of low normal JEET.  Last JEET was normal     Vitals: Blood pressure 129/68, pulse 100, temperature 98.2 °F (36.8 °C), temperature source Oral, resp. rate 18, last menstrual period 2023, SpO2 98%.,There is no height or weight on file to calculate BMI.    Physical Exam  GEN: well developed and well nourished, alert, oriented times 3, and appears comfortable    Abd: soft, non tender, and gravid  SVE: not indicated    See procedure note for NST/JEET    Labs:   Routine Prenatal on 2024   Component Date Value    POCT URINE PROTEIN 2024 trace     GLUCOSE, UA 2024 neg        Lab, Imaging and other studies: I have personally reviewed pertinent reports.    A/P: 19 y.o. female  at 38w4D here for fetal testing  1)NST reactive   2)JEET 11.7 /Vertex  3) Discharge instructions given to patient and labor precautions reviewed.

## 2024-09-23 NOTE — ASSESSMENT & PLAN NOTE
Reviewed with patient that any pregnant women can undergo an elective induction of labor at 39 weeks or later, depending on hospital availability.  One study showed that induction of labor of first time mothers at 39 weeks compared to waiting until 41 weeks, decreased  rate by 3%, and decreased risk of developing high blood pressure in pregnancy.  Alternatively induction of labor can often result in longer in-hospital stays overall, than allowing for spontaneous labor.  This is especially true in patients who have an unfavorable cervix and require the additional step of cervical ripening prior to induction of labor - leading often to an additional 12-24 hours in the hospital prior to delivery, during which there is continuous fetal monitoring.  For all patients who have not gone into labor spontaneously, we recommend induction of labor between 40.6-41.6 weeks, due to increase risk of stillbirth.  Pregnancies past 40.6 weeks who are not in the process of induction, we recommend  testing with NST and JEET 2x/week.    Discussed these pros and cons in relation to patient's desires for her delivery process, as well as the fact that she is a healthy patient with no other risk factors.  She wishes to proceed with IOL. Consent signed.

## 2024-09-24 ENCOUNTER — TELEPHONE (OUTPATIENT)
Age: 19
End: 2024-09-24

## 2024-09-24 NOTE — TELEPHONE ENCOUNTER
Patient called and went to  her prescription today that was ordered on 9/17 and she said it is not covered by her insurance and she did not get it.  Patient also asked if one could that is covered to please be sent to [facility] in [place] on [address].  Please call patient back at [phone] . Thank you

## 2024-09-24 NOTE — TELEPHONE ENCOUNTER
Spoke with pt as to what medication, she was calling about. She was having a problem with getting Clotrimazole cream Rx.. Spoke with [facility] Pharmacist,[address],[place]. He stated Clotrimazole cream Rx was on hold at [facility] pharmacy,at [facility],[place]. The Pharmacist was able to transfer Rx from [place]  to [place] location. The Pharmacist stated pt had refills remaining on previous Rxs.and will have medication ready for  later today. Informed Janice of Pharmacist's conversation and medication availability.

## 2024-09-30 ENCOUNTER — ANESTHESIA EVENT (INPATIENT)
Dept: ANESTHESIOLOGY | Facility: HOSPITAL | Age: 19
DRG: 560 | End: 2024-09-30
Payer: COMMERCIAL

## 2024-09-30 ENCOUNTER — HOSPITAL ENCOUNTER (INPATIENT)
Facility: HOSPITAL | Age: 19
LOS: 2 days | Discharge: HOME | DRG: 560 | End: 2024-10-02
Attending: OBSTETRICS & GYNECOLOGY | Admitting: OBSTETRICS & GYNECOLOGY
Payer: COMMERCIAL

## 2024-09-30 ENCOUNTER — ANESTHESIA (INPATIENT)
Dept: ANESTHESIOLOGY | Facility: HOSPITAL | Age: 19
DRG: 560 | End: 2024-09-30
Payer: COMMERCIAL

## 2024-09-30 ENCOUNTER — HOSPITAL ENCOUNTER (OUTPATIENT)
Dept: LABOR AND DELIVERY | Facility: HOSPITAL | Age: 19
Discharge: HOME | DRG: 560 | End: 2024-09-30
Payer: COMMERCIAL

## 2024-09-30 DIAGNOSIS — Z3A.38 38 WEEKS GESTATION OF PREGNANCY: Primary | ICD-10-CM

## 2024-09-30 PROBLEM — Z34.90 ENCOUNTER FOR ELECTIVE INDUCTION OF LABOR: Status: ACTIVE | Noted: 2024-09-30

## 2024-09-30 LAB
ABO GROUP BLD: NORMAL
BASE EXCESS BLDCOV CALC-SCNC: -6.4 MMOL/L (ref 1–9)
BASOPHILS # BLD AUTO: 0.03 THOUSANDS/ÂΜL (ref 0–0.1)
BASOPHILS NFR BLD AUTO: 0 % (ref 0–1)
BLD GP AB SCN SERPL QL: NEGATIVE
EOSINOPHIL # BLD AUTO: 0.13 THOUSAND/ÂΜL (ref 0–0.61)
EOSINOPHIL NFR BLD AUTO: 1 % (ref 0–6)
ERYTHROCYTE [DISTWIDTH] IN BLOOD BY AUTOMATED COUNT: 17.1 % (ref 11.6–15.1)
HCO3 BLDCOV-SCNC: 18.3 MMOL/L (ref 12.2–28.6)
HCT VFR BLD AUTO: 29.9 % (ref 34.8–46.1)
HGB BLD-MCNC: 9 G/DL (ref 11.5–15.4)
HOLD SPECIMEN: NORMAL
IMM GRANULOCYTES # BLD AUTO: 0.09 THOUSAND/UL (ref 0–0.2)
IMM GRANULOCYTES NFR BLD AUTO: 1 % (ref 0–2)
LYMPHOCYTES # BLD AUTO: 2.45 THOUSANDS/ÂΜL (ref 0.6–4.47)
LYMPHOCYTES NFR BLD AUTO: 25 % (ref 14–44)
MCH RBC QN AUTO: 21.1 PG (ref 26.8–34.3)
MCHC RBC AUTO-ENTMCNC: 30.1 G/DL (ref 31.4–37.4)
MCV RBC AUTO: 70 FL (ref 82–98)
MONOCYTES # BLD AUTO: 0.71 THOUSAND/ÂΜL (ref 0.17–1.22)
MONOCYTES NFR BLD AUTO: 7 % (ref 4–12)
NEUTROPHILS # BLD AUTO: 6.36 THOUSANDS/ÂΜL (ref 1.85–7.62)
NEUTS SEG NFR BLD AUTO: 66 % (ref 43–75)
NRBC BLD AUTO-RTO: 0 /100 WBCS
OXYHGB MFR BLDCOV: 62.6 %
PCO2 BLDCOV: 34.7 MM HG (ref 27–43)
PH BLDCOV: 7.34 [PH] (ref 7.19–7.49)
PLATELET # BLD AUTO: 288 THOUSANDS/UL (ref 149–390)
PMV BLD AUTO: 10.8 FL (ref 8.9–12.7)
PO2 BLDCOV: 27 MM HG (ref 15–45)
RBC # BLD AUTO: 4.27 MILLION/UL (ref 3.81–5.12)
RH BLD: POSITIVE
SAO2 % BLDCOV: 14.4 ML/DL
SARS-COV-2 RNA RESP QL NAA+PROBE: NEGATIVE
SPECIMEN EXPIRATION DATE: NORMAL
TREPONEMA PALLIDUM IGG+IGM AB [PRESENCE] IN SERUM OR PLASMA BY IMMUNOASSAY: NORMAL
WBC # BLD AUTO: 9.77 THOUSAND/UL (ref 4.31–10.16)

## 2024-09-30 PROCEDURE — 86901 BLOOD TYPING SEROLOGIC RH(D): CPT | Performed by: OBSTETRICS & GYNECOLOGY

## 2024-09-30 PROCEDURE — 10907ZC DRAINAGE OF AMNIOTIC FLUID, THERAPEUTIC FROM PRODUCTS OF CONCEPTION, VIA NATURAL OR ARTIFICIAL OPENING: ICD-10-PCS | Performed by: OBSTETRICS & GYNECOLOGY

## 2024-09-30 PROCEDURE — 59409 OBSTETRICAL CARE: CPT | Performed by: OBSTETRICS & GYNECOLOGY

## 2024-09-30 PROCEDURE — 86850 RBC ANTIBODY SCREEN: CPT | Performed by: OBSTETRICS & GYNECOLOGY

## 2024-09-30 PROCEDURE — 86780 TREPONEMA PALLIDUM: CPT | Performed by: OBSTETRICS & GYNECOLOGY

## 2024-09-30 PROCEDURE — 4A1HXCZ MONITORING OF PRODUCTS OF CONCEPTION, CARDIAC RATE, EXTERNAL APPROACH: ICD-10-PCS | Performed by: OBSTETRICS & GYNECOLOGY

## 2024-09-30 PROCEDURE — 86900 BLOOD TYPING SEROLOGIC ABO: CPT | Performed by: OBSTETRICS & GYNECOLOGY

## 2024-09-30 PROCEDURE — 87635 SARS-COV-2 COVID-19 AMP PRB: CPT | Performed by: OBSTETRICS & GYNECOLOGY

## 2024-09-30 PROCEDURE — 82805 BLOOD GASES W/O2 SATURATION: CPT | Performed by: OBSTETRICS & GYNECOLOGY

## 2024-09-30 PROCEDURE — 85025 COMPLETE CBC W/AUTO DIFF WBC: CPT | Performed by: OBSTETRICS & GYNECOLOGY

## 2024-09-30 PROCEDURE — 3E033VJ INTRODUCTION OF OTHER HORMONE INTO PERIPHERAL VEIN, PERCUTANEOUS APPROACH: ICD-10-PCS | Performed by: OBSTETRICS & GYNECOLOGY

## 2024-09-30 PROCEDURE — NC001 PR NO CHARGE: Performed by: OBSTETRICS & GYNECOLOGY

## 2024-09-30 RX ORDER — CALCIUM CARBONATE 500 MG/1
1000 TABLET, CHEWABLE ORAL DAILY PRN
Status: DISCONTINUED | OUTPATIENT
Start: 2024-09-30 | End: 2024-10-02 | Stop reason: HOSPADM

## 2024-09-30 RX ORDER — SODIUM CHLORIDE, SODIUM LACTATE, POTASSIUM CHLORIDE, CALCIUM CHLORIDE 600; 310; 30; 20 MG/100ML; MG/100ML; MG/100ML; MG/100ML
125 INJECTION, SOLUTION INTRAVENOUS CONTINUOUS
Status: DISCONTINUED | OUTPATIENT
Start: 2024-09-30 | End: 2024-09-30

## 2024-09-30 RX ORDER — OXYTOCIN/RINGER'S LACTATE 30/500 ML
1-30 PLASTIC BAG, INJECTION (ML) INTRAVENOUS
Status: DISCONTINUED | OUTPATIENT
Start: 2024-09-30 | End: 2024-09-30

## 2024-09-30 RX ORDER — DOCUSATE SODIUM 100 MG/1
100 CAPSULE, LIQUID FILLED ORAL 2 TIMES DAILY
Status: DISCONTINUED | OUTPATIENT
Start: 2024-09-30 | End: 2024-10-02 | Stop reason: HOSPADM

## 2024-09-30 RX ORDER — SIMETHICONE 80 MG
80 TABLET,CHEWABLE ORAL 4 TIMES DAILY PRN
Status: DISCONTINUED | OUTPATIENT
Start: 2024-09-30 | End: 2024-10-02 | Stop reason: HOSPADM

## 2024-09-30 RX ORDER — ONDANSETRON 2 MG/ML
4 INJECTION INTRAMUSCULAR; INTRAVENOUS EVERY 6 HOURS PRN
Status: DISCONTINUED | OUTPATIENT
Start: 2024-09-30 | End: 2024-09-30

## 2024-09-30 RX ORDER — FENTANYL CITRATE 50 UG/ML
INJECTION, SOLUTION INTRAMUSCULAR; INTRAVENOUS AS NEEDED
Status: DISCONTINUED | OUTPATIENT
Start: 2024-09-30 | End: 2024-09-30 | Stop reason: HOSPADM

## 2024-09-30 RX ORDER — LIDOCAINE HYDROCHLORIDE AND EPINEPHRINE 15; 5 MG/ML; UG/ML
INJECTION, SOLUTION EPIDURAL AS NEEDED
Status: DISCONTINUED | OUTPATIENT
Start: 2024-09-30 | End: 2024-09-30 | Stop reason: HOSPADM

## 2024-09-30 RX ORDER — ROPIVACAINE HYDROCHLORIDE 2 MG/ML
INJECTION, SOLUTION EPIDURAL; INFILTRATION; PERINEURAL AS NEEDED
Status: DISCONTINUED | OUTPATIENT
Start: 2024-09-30 | End: 2024-09-30 | Stop reason: HOSPADM

## 2024-09-30 RX ORDER — FENTANYL CITRATE 50 UG/ML
INJECTION, SOLUTION INTRAMUSCULAR; INTRAVENOUS AS NEEDED
Status: DISCONTINUED | OUTPATIENT
Start: 2024-09-30 | End: 2024-09-30

## 2024-09-30 RX ORDER — LIDOCAINE HYDROCHLORIDE 10 MG/ML
INJECTION, SOLUTION EPIDURAL; INFILTRATION; INTRACAUDAL; PERINEURAL AS NEEDED
Status: DISCONTINUED | OUTPATIENT
Start: 2024-09-30 | End: 2024-09-30 | Stop reason: HOSPADM

## 2024-09-30 RX ORDER — DIPHENHYDRAMINE HCL 25 MG
25 TABLET ORAL EVERY 6 HOURS PRN
Status: DISCONTINUED | OUTPATIENT
Start: 2024-09-30 | End: 2024-10-02 | Stop reason: HOSPADM

## 2024-09-30 RX ORDER — IBUPROFEN 600 MG/1
600 TABLET, FILM COATED ORAL EVERY 6 HOURS
Status: DISCONTINUED | OUTPATIENT
Start: 2024-09-30 | End: 2024-10-02 | Stop reason: HOSPADM

## 2024-09-30 RX ORDER — BUPIVACAINE HYDROCHLORIDE 2.5 MG/ML
30 INJECTION, SOLUTION EPIDURAL; INFILTRATION; INTRACAUDAL ONCE AS NEEDED
Status: DISCONTINUED | OUTPATIENT
Start: 2024-09-30 | End: 2024-09-30

## 2024-09-30 RX ORDER — BENZOCAINE/MENTHOL 6 MG-10 MG
1 LOZENGE MUCOUS MEMBRANE DAILY PRN
Status: DISCONTINUED | OUTPATIENT
Start: 2024-09-30 | End: 2024-10-02 | Stop reason: HOSPADM

## 2024-09-30 RX ORDER — ACETAMINOPHEN 325 MG/1
650 TABLET ORAL EVERY 4 HOURS PRN
Status: DISCONTINUED | OUTPATIENT
Start: 2024-09-30 | End: 2024-10-02 | Stop reason: HOSPADM

## 2024-09-30 RX ORDER — OXYCODONE HYDROCHLORIDE 5 MG/1
5 TABLET ORAL
Status: DISCONTINUED | OUTPATIENT
Start: 2024-09-30 | End: 2024-10-02 | Stop reason: HOSPADM

## 2024-09-30 RX ORDER — ONDANSETRON 2 MG/ML
4 INJECTION INTRAMUSCULAR; INTRAVENOUS EVERY 8 HOURS PRN
Status: DISCONTINUED | OUTPATIENT
Start: 2024-10-01 | End: 2024-10-02 | Stop reason: HOSPADM

## 2024-09-30 RX ORDER — OXYTOCIN/RINGER'S LACTATE 30/500 ML
250 PLASTIC BAG, INJECTION (ML) INTRAVENOUS ONCE
Status: DISCONTINUED | OUTPATIENT
Start: 2024-09-30 | End: 2024-10-01

## 2024-09-30 RX ADMIN — ONDANSETRON 4 MG: 2 INJECTION, SOLUTION INTRAMUSCULAR; INTRAVENOUS at 18:00

## 2024-09-30 RX ADMIN — ROPIVACAINE HYDROCHLORIDE 8 ML: 2 INJECTION, SOLUTION EPIDURAL; INFILTRATION at 16:07

## 2024-09-30 RX ADMIN — ROPIVACAINE HYDROCHLORIDE 5 ML: 2 INJECTION, SOLUTION EPIDURAL; INFILTRATION at 13:19

## 2024-09-30 RX ADMIN — ONDANSETRON 4 MG: 2 INJECTION, SOLUTION INTRAMUSCULAR; INTRAVENOUS at 09:10

## 2024-09-30 RX ADMIN — BENZOCAINE AND LEVOMENTHOL 1 APPLICATION: 200; 5 SPRAY TOPICAL at 20:26

## 2024-09-30 RX ADMIN — SODIUM CHLORIDE, SODIUM LACTATE, POTASSIUM CHLORIDE, AND CALCIUM CHLORIDE 999 ML/HR: .6; .31; .03; .02 INJECTION, SOLUTION INTRAVENOUS at 08:55

## 2024-09-30 RX ADMIN — ACETAMINOPHEN 650 MG: 325 TABLET ORAL at 21:37

## 2024-09-30 RX ADMIN — ROPIVACAINE HYDROCHLORIDE: 2 INJECTION, SOLUTION EPIDURAL; INFILTRATION at 13:33

## 2024-09-30 RX ADMIN — ROPIVACAINE HYDROCHLORIDE 5 ML: 2 INJECTION, SOLUTION EPIDURAL; INFILTRATION at 13:23

## 2024-09-30 RX ADMIN — FENTANYL CITRATE 100 MCG: 50 INJECTION, SOLUTION INTRAMUSCULAR; INTRAVENOUS at 16:07

## 2024-09-30 RX ADMIN — SODIUM CHLORIDE, SODIUM LACTATE, POTASSIUM CHLORIDE, AND CALCIUM CHLORIDE 125 ML/HR: .6; .31; .03; .02 INJECTION, SOLUTION INTRAVENOUS at 11:03

## 2024-09-30 RX ADMIN — IBUPROFEN 600 MG: 600 TABLET, FILM COATED ORAL at 20:25

## 2024-09-30 RX ADMIN — Medication 2 MILLI-UNITS/MIN: at 09:54

## 2024-09-30 RX ADMIN — LIDOCAINE HYDROCHLORIDE 5 ML: 10 INJECTION, SOLUTION EPIDURAL; INFILTRATION; INTRACAUDAL; PERINEURAL at 13:14

## 2024-09-30 RX ADMIN — SODIUM CHLORIDE 2.5 MILLION UNITS: 9 INJECTION, SOLUTION INTRAVENOUS at 16:00

## 2024-09-30 RX ADMIN — SODIUM CHLORIDE 5 MILLION UNITS: 0.9 INJECTION, SOLUTION INTRAVENOUS at 08:57

## 2024-09-30 RX ADMIN — SODIUM CHLORIDE 2.5 MILLION UNITS: 9 INJECTION, SOLUTION INTRAVENOUS at 12:31

## 2024-09-30 RX ADMIN — LIDOCAINE HYDROCHLORIDE,EPINEPHRINE BITARTRATE 3 ML: 15; .005 INJECTION, SOLUTION EPIDURAL; INFILTRATION; INTRACAUDAL; PERINEURAL at 13:17

## 2024-09-30 RX ADMIN — WITCH HAZEL 1 PAD: 500 SOLUTION RECTAL; TOPICAL at 22:21

## 2024-09-30 RX ADMIN — DOCUSATE SODIUM 100 MG: 100 CAPSULE, LIQUID FILLED ORAL at 20:25

## 2024-09-30 NOTE — L&D DELIVERY NOTE
"DELIVERY NOTE  Janice MendozaitoDeleon 19 y.o. female MRN: [id number]  Unit/Bed#: -01 Encounter: [id number]    Obstetrician:  Irving Joya DO    Pre-Delivery Diagnosis: Vega pregnancy in vertex presentation at 39w4d gestation.    Post-Delivery Diagnosis: Same, delivered    Procedure: Spontaneous vaginal delivery    Delivery Date and Time:  9/30/24 1911    Umbilical Artery  No results for input(s): \"PHCART\", \"BECART\" in the last 72 hours.    Umbilical Vein  Recent Labs     09/30/24 1913   PHCVEN 7.341   BECVEN -6.4*       Apgars: 9 at 1 minute, 9 at 5 minutes    Weight: Pending           Complications: None    Description of Procedure:  Patient was found to be completely dilated and +3 station . She pushed for 1 minutes to spontaneously deliver a liveborn infant in OA position through clear fluid at 1911. The head and shoulders delivered easily, with the body easily delivering thereafter. The infant was placed on maternal abdomen. Cord clamping was delayed for 30 seconds, after which the cord was doubly clamped and cut. Routine cord gases and cord blood were collected. Pitocin was started for active management of the 3rd stage. Placenta delivered spontaneously and was noted to have a centrally-inserted 3-vessel cord. The placenta was sent for storage. Bimanual exam was performed, and the fundus was noted to be firm. A thorough pelvic exam revealed a periurethral and right labial laceration, which was repaired with 4-0 polyglactin suture in typical fashion. Excellent hemostasis was noted, with total QBL pending per nurse. All needle, sponge, and instrument counts were noted to be correct. The patient tolerated the procedure well and was allowed to recover in labor and delivery room.     Irving Joya DO  9/30/2024 7:28 PM      "

## 2024-09-30 NOTE — OB LABOR/OXYTOCIN SAFETY PROGRESS
Labor Progress Note - Janice Crawford ExpositoDeleon 19 y.o. female MRN: [id number]    Unit/Bed#: -01 Encounter: [id number]    Dose (damaris-units/min) Oxytocin: 16 damaris-units/min  Contraction Frequency (minutes): 3-5  Contraction Intensity: Mild  Uterine Activity Characteristics: Regular  Cervical Dilation: 8        Cervical Effacement: 100  Fetal Station: 0  Baseline Rate (FHR): 120 bpm  Fetal Heart Rate (FHT): 150 BPM  FHR Category: 1               Vital Signs:   Vitals:    09/30/24 1532   BP: 117/60   Pulse: 89   Resp:    Temp:    SpO2:        Notes/comments:   Making good progress, no membranes palpated. Minimal fluid seen on pads. Cont with pitocin      Irving Joya DO 9/30/2024 3:49 PM

## 2024-09-30 NOTE — PLAN OF CARE
Problem: BIRTH - VAGINAL/ SECTION  Goal: Fetal and maternal status remain reassuring during the birth process  Description: INTERVENTIONS:  - Monitor vital signs  - Monitor fetal heart rate  - Monitor uterine activity  - Monitor labor progression (vaginal delivery)  - DVT prophylaxis  - Antibiotic prophylaxis  Outcome: Progressing  Goal: Emotionally satisfying birthing experience for mother/fetus  Description: Interventions:  - Assess, plan, implement and evaluate the nursing care given to the patient in labor  - Advocate the philosophy that each childbirth experience is a unique experience and support the family's chosen level of involvement and control during the labor process   - Actively participate in both the patient's and family's teaching of the birth process  - Consider cultural, Holiness and age-specific factors and plan care for the patient in labor  Outcome: Progressing     Problem: POSTPARTUM  Goal: Experiences normal postpartum course  Description: INTERVENTIONS:  - Monitor maternal vital signs  - Assess uterine involution and lochia  Outcome: Progressing  Goal: Appropriate maternal -  bonding  Description: INTERVENTIONS:  - Identify family support  - Assess for appropriate maternal/infant bonding   -Encourage maternal/infant bonding opportunities  - Referral to  or  as needed  Outcome: Progressing  Goal: Establishment of infant feeding pattern  Description: INTERVENTIONS:  - Assess breast/bottle feeding  - Refer to lactation as needed  Outcome: Progressing  Goal: Incision(s), wounds(s) or drain site(s) healing without S/S of infection  Description: INTERVENTIONS  - Assess and document dressing, incision, wound bed, drain sites and surrounding tissue  - Provide patient and family education  Outcome: Progressing     Problem: POSTPARTUM  Goal: Appropriate maternal -  bonding  Description: INTERVENTIONS:  - Identify family support  - Assess for appropriate  maternal/infant bonding   -Encourage maternal/infant bonding opportunities  - Referral to  or  as needed  Outcome: Progressing     Problem: PAIN - ADULT  Goal: Verbalizes/displays adequate comfort level or baseline comfort level  Description: Interventions:  - Encourage patient to monitor pain and request assistance  - Assess pain using appropriate pain scale  - Administer analgesics based on type and severity of pain and evaluate response  - Implement non-pharmacological measures as appropriate and evaluate response  - Consider cultural and social influences on pain and pain management  - Notify physician/advanced practitioner if interventions unsuccessful or patient reports new pain  Outcome: Progressing     Problem: INFECTION - ADULT  Goal: Absence or prevention of progression during hospitalization  Description: INTERVENTIONS:  - Assess and monitor for signs and symptoms of infection  - Monitor lab/diagnostic results  - Monitor all insertion sites, i.e. indwelling lines, tubes, and drains  - Monitor endotracheal if appropriate and nasal secretions for changes in amount and color  - [place] appropriate cooling/warming therapies per order  - Administer medications as ordered  - Instruct and encourage patient and family to use good hand hygiene technique  - Identify and instruct in appropriate isolation precautions for identified infection/condition  Outcome: Progressing  Goal: Absence of fever/infection during neutropenic period  Description: INTERVENTIONS:  - Monitor WBC    Outcome: Progressing     Problem: SAFETY ADULT  Goal: Patient will remain free of falls  Description: INTERVENTIONS:  - Educate patient/family on patient safety including physical limitations  - Instruct patient to call for assistance with activity   - Consult OT/PT to assist with strengthening/mobility   - Keep Call bell within reach  - Keep bed low and locked with side rails adjusted as appropriate  - Keep care  items and personal belongings within reach  - Initiate and maintain comfort rounds  - Make Fall Risk Sign visible to staff  - Apply yellow socks and bracelet for high fall risk patients  - Consider moving patient to room near nurses station  Outcome: Progressing  Goal: Maintain or return to baseline ADL function  Description: INTERVENTIONS:  -  Assess patient's ability to carry out ADLs; assess patient's baseline for ADL function and identify physical deficits which impact ability to perform ADLs (bathing, care of mouth/teeth, toileting, grooming, dressing, etc.)  - Assess/evaluate cause of self-care deficits   - Assess range of motion  - Assess patient's mobility; develop plan if impaired  - Assess patient's need for assistive devices and provide as appropriate  - Encourage maximum independence but intervene and supervise when necessary  - Involve family in performance of ADLs  - Assess for home care needs following discharge   - Consider OT consult to assist with ADL evaluation and planning for discharge  - Provide patient education as appropriate  Outcome: Progressing  Goal: Maintains/Returns to pre admission functional level  Description: INTERVENTIONS:  - Perform AM-PAC 6 Click Basic Mobility/ Daily Activity assessment daily.  - Set and communicate daily mobility goal to care team and patient/family/caregiver.   - Collaborate with rehabilitation services on mobility goals if consulted  - Out of bed for toileting  - Record patient progress and toleration of activity level   Outcome: Progressing     Problem: Knowledge Deficit  Goal: Patient/family/caregiver demonstrates understanding of disease process, treatment plan, medications, and discharge instructions  Description: Complete learning assessment and assess knowledge base.  Interventions:  - Provide teaching at level of understanding  - Provide teaching via preferred learning methods  Outcome: Progressing     Problem: DISCHARGE PLANNING  Goal: Discharge to  home or other facility with appropriate resources  Description: INTERVENTIONS:  - Identify barriers to discharge w/patient and caregiver  - Arrange for needed discharge resources and transportation as appropriate  - Identify discharge learning needs (meds, wound care, etc.)  - Arrange for interpretive services to assist at discharge as needed  - Refer to Case Management Department for coordinating discharge planning if the patient needs post-hospital services based on physician/advanced practitioner order or complex needs related to functional status, cognitive ability, or social support system  Outcome: Progressing

## 2024-09-30 NOTE — ANESTHESIA PREPROCEDURE EVALUATION
Procedure:  LABOR ANALGESIA    Relevant Problems   ANESTHESIA (within normal limits)      HEMATOLOGY   (+) Anemia affecting pregnancy in third trimester     Labs:   Results from last 7 days   Lab Units 09/30/24  0904   WBC Thousand/uL 9.77   HEMOGLOBIN g/dL 9.0*   HEMATOCRIT % 29.9*   PLATELETS Thousands/uL 288   SEGS PCT % 66   MONO PCT % 7   EOS PCT % 1       Type and Screen:  Results from last 7 days   Lab Units 09/30/24  0904   ABO GROUPING  O   RH FACTOR  Positive   ANTIBODY SCREEN  Negative   SPECIMEN EXPIRATION DATE  20241003        Physical Exam    Airway    Mallampati score: I  TM Distance: >3 FB  Neck ROM: full     Dental   No notable dental hx     Cardiovascular  Cardiovascular exam normal    Pulmonary  Pulmonary exam normal     Other Findings  post-pubertal.      Anesthesia Plan  ASA Score- 1     Anesthesia Type- epidural with ASA Monitors.         Additional Monitors:     Airway Plan:     Comment: Patient is requesting epidural for labor.  Patient seen and examined.  The risks/benefits of Epidural anesthesia discussed including risk of PDPH, partial or failed block, and rare emergencies including infection, nerve injury and total spinal anesthesia.  Anesthetic plan for potential c/s in event of emergency reviewed with patient.  .       Plan Factors-    Chart reviewed.   Existing labs reviewed. Patient summary reviewed.                  Induction-     Postoperative Plan-     Perioperative Resuscitation Plan - Level 1 - Full Code.       Informed Consent- Anesthetic plan and risks discussed with patient.

## 2024-09-30 NOTE — OB LABOR/OXYTOCIN SAFETY PROGRESS
Labor Progress Note - Janice Crawford ExpositoDeleon 19 y.o. female MRN: [id number]    Unit/Bed#: -01 Encounter: [id number]    Dose (damaris-units/min) Oxytocin: 16 damaris-units/min  Contraction Frequency (minutes): 2-3  Contraction Intensity: Moderate  Uterine Activity Characteristics: Regular  Cervical Dilation: Lip/rim (Comment)        Cervical Effacement: 100  Fetal Station: 0  Baseline Rate (FHR): 140 bpm  Fetal Heart Rate (FHT): 150 BPM  FHR Category:                Vital Signs:   Vitals:    09/30/24 1803   BP: 128/61   Pulse: (!) 115   Resp:    Temp:    SpO2:        Notes/comments:   Pt complete. Attempted to push however no fetal movement. Feel as if head is OP. Will let labor down and use spinning babies technique.       Irving Joya DO 9/30/2024 6:27 PM

## 2024-09-30 NOTE — H&P
OB H&P  Janice Crawford ExpositoDeleon  2005  /-[birth year]          19 y.o. yo  at 39 weeks by us and lmp      Assessment:/Plan:     IUP at 39 weeks.   GBS positive in urine  Pcn to begin now, delayed ROM  eIOL   Begin pitocin now   Pt understands risks/benefits/options, elects for eIOL  Short interval pregnancy   Has had adequate PNC  Anemia   Hgb of  = 9.6   On oral iron supplementation    Chief complaint:  eIOL    HPI:  Contractions:  no  Fetal movement:  yes  Vaginal bleeding:   no  Leaking of fluid:  no      Pregnancy Complications:  Patient Active Problem List   Diagnosis    38 weeks gestation of pregnancy    Prenatal care, subsequent pregnancy, first trimester    Short interval between pregnancies affecting pregnancy in second trimester, antepartum    Severe obesity due to excess calories affecting pregnancy in third trimester (HCC)    Pelvic pain in pregnancy    Rash    Anemia affecting pregnancy in third trimester    Vaginal discharge during pregnancy in third trimester    Chlamydia infection affecting pregnancy in third trimester     uterine contractions in third trimester, antepartum    GBS (group b Streptococcus) UTI complicating pregnancy, third trimester    Chest pain during pregnancy    Amniotic fluid index borderline low    Hematuria         Past Medical History:  No past medical history on file.    Past Surgical History:  No past surgical history on file.    Social Hx:  Social History     Socioeconomic History    Marital status: /Civil Union     Spouse name: Not on file    Number of children: Not on file    Years of education: Not on file    Highest education level: Not on file   Occupational History    Not on file   Tobacco Use    Smoking status: Never     Passive exposure: Never    Smokeless tobacco: Never   Vaping Use    Vaping status: Never Used   Substance and Sexual Activity    Alcohol use: Not Currently    Drug use: Never    Sexual activity: Yes      "Partners: Male     Birth control/protection: None   Other Topics Concern    Not on file   Social History Narrative    Not on file     Social Determinants of Health     Financial Resource Strain: Not on file   Food Insecurity: No Food Insecurity (2024)    Hunger Vital Sign     Worried About Running Out of Food in the Last Year: Never true     Ran Out of Food in the Last Year: Never true   Transportation Needs: No Transportation Needs (2024)    PRAPARE - Transportation     Lack of Transportation (Medical): No     Lack of Transportation (Non-Medical): No   Physical Activity: Not on file   Stress: Not on file   Social Connections: Not on file   Intimate Partner Violence: Not on file   Housing Stability: Low Risk  (2024)    Housing Stability Vital Sign     Unable to Pay for Housing in the Last Year: No     Number of Times Moved in the Last Year: 0     Homeless in the Last Year: No       Meds:  No current facility-administered medications on file prior to encounter.     Current Outpatient Medications on File Prior to Encounter   Medication Sig Dispense Refill    Ferrous Sulfate (SLOW FE PO) Take by mouth      sertraline (Zoloft) 50 mg tablet Take 1 tablet (50 mg total) by mouth daily 30 tablet 2    clotrimazole (GYNE-LOTRIMIN) 1 % vaginal cream 1 applicatorful (about 5 grams) per vagina qHS x 14 days, then 1 applicatorful per vagina twice weekly. 40 g 3       Allergies:  No Known Allergies    RoS/    Constitutional: Negative    CV: Negative    Pulm: Negative    GI: Negative    Urinary: Negative    Neuro: Negative    Musculoskeletal: Negative    Obstetric History:    G 3 P 1011  1  6 lb 1 oz  2023    Labs:  No results found for: \"STREPGRPB\"  Type & Screen:  ABO Grouping   Date Value Ref Range Status   04/15/2024 O  Final      Rh Type   Date Value Ref Range Status   04/15/2024 Positive  Final     Comment:     Please note: Prior records for this patient's ABO / Rh type are not  available for additional " "verification.      No results found for: \"ANTIBODYSCR\"  No results found for: \"SPECIMEXP\"  HIV-1/HIV-2 AB   Date Value Ref Range Status   04/15/2024 Non-Reactive  Final     Hepatitis B Surface Ag   Date Value Ref Range Status   04/15/2024 Negative  Final     RPR   Date Value Ref Range Status   07/13/2024 Non Reactive Non Reactive Final     No results found for: \"RUBELLAIGGQT\"  Glucose   Date Value Ref Range Status   07/13/2024 112 70 - 139 mg/dL Final     Comment:     According to ADA, a glucose threshold of >139 mg/dL after 50-gram  load identifies approximately 80% of women with gestational  diabetes mellitus, while the sensitivity is further increased to  approximately 90% by a threshold of >129 mg/dL.               Physical Exam:      Vital signs: 128/68  106  16  afebrile     Alert, comfortable, no acute distress      Neuro:        Alert, appropriate affect, no gross deficits        Normal speech        CN2-12 intact and symmetric        DTR 3+ and symmetric        Muscle strength 5/5 and symmetric    Regular rate and rhythm    Clear to auscultation bilaterally    Abdomen soft, nontender, nondistended.    Fundus nontender, size consistent with dates      Cephalic  Cervix: 3-4 cm  50%  -2  FHR: cat 1  CTXN: none  Membranes: intact      "

## 2024-09-30 NOTE — OB LABOR/OXYTOCIN SAFETY PROGRESS
Labor Progress Note - Janice Crawford ExpositoDeleon 19 y.o. female MRN: [id number]    Unit/Bed#: -01 Encounter: [id number]    Dose (damaris-units/min) Oxytocin: 16 damaris-units/min  Contraction Frequency (minutes): 2-5  Contraction Intensity: Moderate  Uterine Activity Characteristics: Regular  Cervical Dilation: Lip/rim (Comment)        Cervical Effacement: 100  Fetal Station: 0  Baseline Rate (FHR): 120 bpm  Fetal Heart Rate (FHT): 150 BPM  FHR Category: 1               Vital Signs:   Vitals:    09/30/24 1702   BP: 117/58   Pulse: 103   Resp:    Temp:    SpO2:        Notes/comments:   Doing well. Cont with pitocin.       Irving Joya DO 9/30/2024 5:32 PM

## 2024-09-30 NOTE — OB LABOR/OXYTOCIN SAFETY PROGRESS
Oxytocin Safety Progress Check Note - Janice Crawford ExpositoDeleon 19 y.o. female MRN: [id number]    Unit/Bed#: -01 Encounter: [id number]    Dose (damaris-units/min) Oxytocin: 8 damaris-units/min  Contraction Frequency (minutes): ctx x 1  Contraction Intensity: Mild  Uterine Activity Characteristics: Irritability  Cervical Dilation: 3-4        Cervical Effacement: 50  Fetal Station: -2  Baseline Rate (FHR): 120 bpm  Fetal Heart Rate (FHT): 125 BPM  FHR Category: 1               Vital Signs:   Vitals:    09/30/24 1149   BP: 126/60   Pulse: 93   Resp:    Temp:    SpO2:        Notes/comments:   Doing well. Continue with pitocin. Epidural when desires.       Irving Joya DO 9/30/2024 12:23 PM

## 2024-09-30 NOTE — OB LABOR/OXYTOCIN SAFETY PROGRESS
Oxytocin Safety Progress Check Note - Janice Crawford ExpositoDeleon 19 y.o. female MRN: [id number]    Unit/Bed#: -01 Encounter: [id number]    Dose (damaris-units/min) Oxytocin: 8 damaris-units/min  Contraction Frequency (minutes): 5-6  Contraction Intensity: Mild  Uterine Activity Characteristics: Irregular  Cervical Dilation: 6        Cervical Effacement: 80  Fetal Station: -1  Baseline Rate (FHR): 120 bpm  Fetal Heart Rate (FHT): 125 BPM  FHR Category: 1               Vital Signs:   Vitals:    09/30/24 1331   BP: 129/60   Pulse: 102   Resp:    Temp:    SpO2:        Notes/comments:   S/p epidural. AROM attempted. Felt bag snag, however no fluid seen. Will re-evaluate with next exam. Continue pitocin      Irving Joya DO 9/30/2024 1:42 PM

## 2024-09-30 NOTE — ANESTHESIA PROCEDURE NOTES
Epidural Block    Patient location during procedure: OB/L&D  Start time: 9/30/2024 1:17 PM  Reason for block: at surgeon's request and primary anesthetic  Staffing  Performed by: Suhail Garcia MD  Authorized by: Suhail Garcia MD    Preanesthetic Checklist  Completed: patient identified, IV checked, risks and benefits discussed, surgical consent, monitors and equipment checked, pre-op evaluation and timeout performed  Epidural  Patient position: sitting  Prep: ChloraPrep  Sedation Level: no sedation  Patient monitoring: frequent blood pressure checks and continuous pulse ox  Approach: midline  Location: lumbar, L3-4  Injection technique: GIANCARLO air  Needle  Needle type: Tuohy   Needle gauge: 17 G  Needle insertion depth: 5.5 cm  Catheter type: end hole  Catheter size: 19 G  Catheter at skin depth: 10 cm  Catheter securement method: stabilization device  Test dose: negative  Assessment  Number of attempts: 1negative aspiration for CSF, negative aspiration for heme and no paresthesia on injection  patient tolerated the procedure well with no immediate complications

## 2024-10-01 LAB
BASOPHILS # BLD AUTO: 0.05 THOUSANDS/ÂΜL (ref 0–0.1)
BASOPHILS NFR BLD AUTO: 0 % (ref 0–1)
DME PARACHUTE DELIVERY DATE ACTUAL: NORMAL
DME PARACHUTE DELIVERY DATE REQUESTED: NORMAL
DME PARACHUTE ITEM DESCRIPTION: NORMAL
DME PARACHUTE ORDER STATUS: NORMAL
DME PARACHUTE SUPPLIER NAME: NORMAL
DME PARACHUTE SUPPLIER PHONE: NORMAL
EOSINOPHIL # BLD AUTO: 0.23 THOUSAND/ÂΜL (ref 0–0.61)
EOSINOPHIL NFR BLD AUTO: 2 % (ref 0–6)
ERYTHROCYTE [DISTWIDTH] IN BLOOD BY AUTOMATED COUNT: 17.2 % (ref 11.6–15.1)
HCT VFR BLD AUTO: 27.6 % (ref 34.8–46.1)
HGB BLD-MCNC: 8.1 G/DL (ref 11.5–15.4)
IMM GRANULOCYTES # BLD AUTO: 0.1 THOUSAND/UL (ref 0–0.2)
IMM GRANULOCYTES NFR BLD AUTO: 1 % (ref 0–2)
LYMPHOCYTES # BLD AUTO: 3.22 THOUSANDS/ÂΜL (ref 0.6–4.47)
LYMPHOCYTES NFR BLD AUTO: 24 % (ref 14–44)
MCH RBC QN AUTO: 20.9 PG (ref 26.8–34.3)
MCHC RBC AUTO-ENTMCNC: 29.3 G/DL (ref 31.4–37.4)
MCV RBC AUTO: 71 FL (ref 82–98)
MONOCYTES # BLD AUTO: 1.33 THOUSAND/ÂΜL (ref 0.17–1.22)
MONOCYTES NFR BLD AUTO: 10 % (ref 4–12)
NEUTROPHILS # BLD AUTO: 8.32 THOUSANDS/ÂΜL (ref 1.85–7.62)
NEUTS SEG NFR BLD AUTO: 63 % (ref 43–75)
NRBC BLD AUTO-RTO: 0 /100 WBCS
PLATELET # BLD AUTO: 239 THOUSANDS/UL (ref 149–390)
PMV BLD AUTO: 10.4 FL (ref 8.9–12.7)
RBC # BLD AUTO: 3.87 MILLION/UL (ref 3.81–5.12)
WBC # BLD AUTO: 13.25 THOUSAND/UL (ref 4.31–10.16)

## 2024-10-01 PROCEDURE — 85025 COMPLETE CBC W/AUTO DIFF WBC: CPT | Performed by: OBSTETRICS & GYNECOLOGY

## 2024-10-01 PROCEDURE — 99024 POSTOP FOLLOW-UP VISIT: CPT | Performed by: OBSTETRICS & GYNECOLOGY

## 2024-10-01 RX ADMIN — DOCUSATE SODIUM 100 MG: 100 CAPSULE, LIQUID FILLED ORAL at 08:13

## 2024-10-01 RX ADMIN — ACETAMINOPHEN 650 MG: 325 TABLET ORAL at 08:17

## 2024-10-01 RX ADMIN — ACETAMINOPHEN 650 MG: 325 TABLET ORAL at 17:05

## 2024-10-01 RX ADMIN — DOCUSATE SODIUM 100 MG: 100 CAPSULE, LIQUID FILLED ORAL at 17:05

## 2024-10-01 RX ADMIN — ACETAMINOPHEN 650 MG: 325 TABLET ORAL at 04:10

## 2024-10-01 RX ADMIN — IBUPROFEN 600 MG: 600 TABLET, FILM COATED ORAL at 04:06

## 2024-10-01 RX ADMIN — IBUPROFEN 600 MG: 600 TABLET, FILM COATED ORAL at 10:34

## 2024-10-01 RX ADMIN — SERTRALINE HYDROCHLORIDE 50 MG: 50 TABLET ORAL at 08:13

## 2024-10-01 RX ADMIN — DIPHENHYDRAMINE HYDROCHLORIDE 25 MG: 25 TABLET ORAL at 00:56

## 2024-10-01 RX ADMIN — IRON SUCROSE 200 MG: 20 INJECTION, SOLUTION INTRAVENOUS at 13:31

## 2024-10-01 NOTE — PLAN OF CARE

## 2024-10-01 NOTE — LACTATION NOTE
"This note was copied from a baby's chart.  Met with parents to discuss feeding plan. Mother discussed that she primarily desires to breastfeed directly and only provided formula feeling that baby was not latching well and concerned of whether he was receiving enough for feedings.    Discussed appropriate volumes and paced bottle feeding if continuing to supplement. Manual breast pump was set up at sink with syringe for milk collection and a basin for cleaning parts. Discussed using manual breast pump as stimulation if baby does not latch and/or supplement is given during feeding and breast is not offered.    The Ready, Set, Baby Booklet was discussed. Discussed importance of skin to skin to help baby awaken for breastfeeding, to help with milk production as well as stabilize temperature, blood sugars, decrease pain, promote relaxation, and calm the baby as well as for bonding that father may do as well. Discussed tummy size progression as milk production increases to meet the nutritional/growing needs of the baby and risks associated with introducing early supplementation that is not medically indicated. Discussed alternative feeding methods as a manner to provide baby with additional colostrum/breast milk if baby is sleepy and/or unable to breastfeed directly to help protect the milk supply and preserve latching abilities at the breast. Mother was encouraged to hand express after feedings to provide \"dessert\" and when sleepy to hand express to provide \"snacks\" which could help baby to awaken for a feeding.    Discussed “Second Night Syndrome” explaining how baby’s cluster feeds to meet growing needs. Growth spurts periods were discussed within the first year and how cluster feeding helps boost milk supply.    Explained feeding cues and fullness cues as well as importance of obtaining a deep latch for effective milk removal and proper positioning (tummy to tummy, at level, nose to nipple, bring chin to breast first " and bringing baby to breast) with ear, shoulder, and hip alignment. Demonstrated how to hold, compress and perform hand expression.    Addressed breast pump needs and mother discussed that she will want the Zomee breast pump for home use. Case management consult will be placed.    Parents were made aware of how to communicate with lactation and encouraged to reach out for a latch assessment, continued support and/or questions that arise.

## 2024-10-01 NOTE — PROGRESS NOTES
"Ob Postpartum Note  Janice Crawford ExpositoDeleon 19 y.o. female MRN: [id number]  Unit/Bed#: -01 Encounter: [id number]    A/P.  19 y.o.  PPD#1 s/p delivery (Vaginal, Spontaneous) at 39w4d of 3155 g (6 lb 15.3 oz) male , apgars 9 /9 .  (1) PPD#1.  Delivered 2024  7:11 PM.  --> Routine postpartum care.    (2) Anemia.  Hgb 8.1.  --> IV iron.    Marlon Gomez MD  10/01/24  ---------------------------------------------------------------------------------------------    Subjective/    Feels well.  Tolerating po, ambulating, voiding without difficulty.  Happy.  Bonding.  Mild cramps, appropriate lochia.    Objective/  Blood pressure 131/85, pulse 94, temperature 97.8 °F (36.6 °C), temperature source Temporal, resp. rate 18, height 5' 3\" (1.6 m), weight 86.2 kg (190 lb), last menstrual period 2023, SpO2 98%, currently breastfeeding.    Patient Vitals for the past 24 hrs:   BP Temp Temp src Pulse Resp SpO2   10/01/24 1100 131/85 97.8 °F (36.6 °C) Temporal 94 18 98 %   10/01/24 0712 117/65 97.7 °F (36.5 °C) Oral 87 18 98 %   10/01/24 0300 111/56 97.8 °F (36.6 °C) Temporal 82 18 --   24 132/61 -- -- (!) 111 18 100 %   24 139/63 98.4 °F (36.9 °C) Oral 91 18 100 %   24 123/68 -- -- 94 18 100 %   24 125/74 -- -- (!) 106 18 99 %   24 127/61 97.9 °F (36.6 °C) Oral (!) 108 18 98 %   24 127/61 -- -- (!) 110 -- --   24 125/58 -- -- 101 -- --   24 193 127/60 97.9 °F (36.6 °C) Oral 102 18 98 %   24 1845 123/57 -- -- 95 -- --   24 1833 120/57 -- -- 93 -- --   24 1817 125/64 -- -- 98 -- --   24 1803 128/61 -- -- (!) 115 -- --   24 1747 123/60 98.8 °F (37.1 °C) Temporal (!) 117 -- --   24 1733 129/68 -- -- (!) 115 -- --   24 1702 117/58 -- -- 103 -- --   24 1657 116/59 -- -- 104 -- --   24 1647 118/59 -- -- (!) 106 -- --   24 1646 120/60 -- -- 104 -- -- "   09/30/24 1632 130/63 -- -- (!) 106 -- --   09/30/24 1618 129/68 -- -- 99 -- --   09/30/24 1611 114/61 -- -- 94 -- --   09/30/24 1609 108/57 -- -- 88 -- --   09/30/24 1602 114/56 -- -- 92 -- --   09/30/24 1547 128/66 -- -- 90 -- --   09/30/24 1532 117/60 -- -- 89 -- --   09/30/24 1517 117/57 -- -- 93 -- --   09/30/24 1502 105/55 -- -- 97 -- --   09/30/24 1447 104/51 -- -- 90 -- --   09/30/24 1432 118/58 -- -- 97 -- --   09/30/24 1417 120/60 -- -- (!) 107 -- --   09/30/24 1402 128/67 -- -- 105 -- --   09/30/24 1346 123/58 -- -- 99 -- --   09/30/24 1343 120/60 -- -- 105 -- --   09/30/24 1340 128/58 -- -- 96 -- --   09/30/24 1337 116/58 -- -- 93 -- --   09/30/24 1334 116/53 -- -- 100 -- --   09/30/24 1331 129/60 -- -- 102 -- --   09/30/24 1328 117/58 -- -- 104 -- --   09/30/24 1325 117/59 -- -- 100 -- --   09/30/24 1322 130/61 -- -- 97 -- 93 %   09/30/24 1320 133/61 -- -- 103 -- --   09/30/24 1318 -- -- -- 105 -- 99 %   09/30/24 1317 113/66 -- -- 87 -- --   09/30/24 1316 120/64 98.4 °F (36.9 °C) Temporal 103 -- --   09/30/24 1313 -- -- -- 103 -- 100 %   09/30/24 1312 -- -- -- (!) 121 -- --   09/30/24 1308 -- -- -- (!) 112 -- --   09/30/24 1307 -- -- -- (!) 108 -- (!) 85 %         Physical Exam/      General:  Alert, comfortable, NAD      Cardiovascular: Regular rate and rhythm      Respiratory: Clear to auscultation bilaterally.      Abdomen: Soft, non-tender, non-distended      Fundus: Firm, below umbilicus, nontender      Extremities: Warm, non-tender      Labs/      Blood type:  O+/-      Rubella: Immune      Lab Results   Component Value Date    WBC 13.25 (H) 10/01/2024    HGB 8.1 (L) 10/01/2024    HCT 27.6 (L) 10/01/2024    MCV 71 (L) 10/01/2024     10/01/2024

## 2024-10-01 NOTE — CASE MANAGEMENT
Case Management Assessment    Patient name Janice Wallace  Location  204/-01 MRN [id number]  : 2005 Date 10/1/2024       Current Admission Date: 2024  Current Admission Diagnosis:Encounter for elective induction of labor   Patient Active Problem List    Diagnosis Date Noted Date Diagnosed    Encounter for elective induction of labor 2024     Chest pain during pregnancy 2024     Amniotic fluid index borderline low 2024     Hematuria 2024     GBS (group b Streptococcus) UTI complicating pregnancy, third trimester 2024      uterine contractions in third trimester, antepartum 2024     Chlamydia infection affecting pregnancy in third trimester 08/15/2024     Vaginal discharge during pregnancy in third trimester 2024     Anemia affecting pregnancy in third trimester 07/15/2024     Rash 2024     Pelvic pain in pregnancy 05/15/2024     Short interval between pregnancies affecting pregnancy in second trimester, antepartum 2024     Severe obesity due to excess calories affecting pregnancy in third trimester (HCC) 2024     38 weeks gestation of pregnancy 2024     Prenatal care, subsequent pregnancy, first trimester 2024       LOS (days): 1  Geometric Mean LOS (GMLOS) (days):   Days to GMLOS:     OBJECTIVE:    Risk of Unplanned Readmission Score: 5.81         Current admission status: Inpatient       Preferred Pharmacy:   [facility]/pharmacy #[id number] - [place] - [address]   [place] [address]  Phone: [phone] Fax: [phone]    [facility]/pharmacy #[id number] - [place] - [address]  [facility]  [facility] [address]  [place] [address]  Phone: [phone] Fax: [phone]    Primary Care Provider: HUMBERTO C[place]ton    Primary Insurance: [employer]  Secondary Insurance:     ASSESSMENT:  Active Health Care Proxies    There are no active Health Care Proxies on file.             Social Determinants of Health (SDOH)      Flowsheet Row Most Recent Value   Housing Stability    In the last 12 months, was there a time when you were not able to pay the mortgage or rent on time? N   In the past 12 months, how many times have you moved where you were living? 0   At any time in the past 12 months, were you homeless or living in a shelter (including now)? N   Transportation Needs    In the past 12 months, has lack of transportation kept you from medical appointments or from getting medications? no   In the past 12 months, has lack of transportation kept you from meetings, work, or from getting things needed for daily living? No   Food Insecurity    Within the past 12 months, you worried that your food would run out before you got the money to buy more. Never true   Within the past 12 months, the food you bought just didn't last and you didn't have money to get more. Never true   Utilities    In the past 12 months has the electric, gas, oil, or water company threatened to shut off services in your home? No          Consult: Mother would like the Zomee double breast pump for home use.    Order for Zomee breast pump ordered via paracte. Approved and delivered. Delivery ticket signed.

## 2024-10-01 NOTE — ANESTHESIA POSTPROCEDURE EVALUATION
Post-Op Assessment Note    CV Status:  Stable    Pain management: adequate      Post-op block assessment: catheter intact and no complications   Mental Status:  Alert and awake   Hydration Status:  Euvolemic   PONV Controlled:  Controlled   Airway Patency:  Patent     Post Op Vitals Reviewed: Yes    No anethesia notable event occurred.    Staff: Anesthesiologist               /68 (09/30/24 2017)    Temp      Pulse 94 (09/30/24 2017)   Resp 18 (09/30/24 2017)    SpO2 100 % (09/30/24 2017)

## 2024-10-01 NOTE — PLAN OF CARE

## 2024-10-01 NOTE — LACTATION NOTE
This note was copied from a baby's chart.  CONSULT - LACTATION  Baby Boy (Kermit Wallace 1 days male MRN: [id number]    [facility] NURSERY Room / Bed: (N)/(N) Encounter: [id number]    Maternal Information     MOTHER:  Janice Wallace  Maternal Age: 19 y.o.  OB History: # 1 - Date: 2022, Sex: None, Weight: None, GA: None, Type: None, Apgar1: None, Apgar5: None, Living: None, Birth Comments: None    # 2 - Date: 23, Sex: Female, Weight: 2750 g (6 lb 1 oz), GA: 41w6d, Type: Vaginal, Spontaneous, Apgar1: None, Apgar5: None, Living: Living, Birth Comments: Uncomplicated pregnacy    # 3 - Date: 24, Sex: Male, Weight: 3155 g (6 lb 15.3 oz), GA: 39w4d, Type: Vaginal, Spontaneous, Apgar1: 9, Apgar5: 9, Living: Living, Birth Comments: None   Previouse breast reduction surgery? No    Lactation history:   Has patient previously breast fed: Yes   How long had patient previously breast fed: 4 months with first child   Previous breast feeding complications:     No past surgical history on file.    Birth information:  YOB: 2026   Time of birth: 7:11 PM   Sex: male   Delivery type: Vaginal, Spontaneous   Birth Weight: 3155 g (6 lb 15.3 oz)   Percent of Weight Change: -1%     Gestational Age: 39w4d   [unfilled]    Assessment     Breast and nipple assessment: large breast    Stowell Assessment: sleepy    Feeding assessment:  expressed 15 drops and provided via finger feeding  LATCH:  Latch: Too sleepy or reluctant, no latch achieved   Audible Swallowing: A few with stimulation   Type of Nipple: Everted (After stimulation)   Comfort (Breast/Nipple): Soft/non-tender   Hold (Positioning): Partial assist, teach one side, mother does other, staff holds   LATCH Score: 0          Feeding recommendations:   offer breasts first, if not latching and/or refusing, hand express/pump to provided expressed colostrum. May supplement with slow  flow nipple, discussed volumes    Lulu Polanco RN 10/1/2024 12:19 PM

## 2024-10-02 VITALS
HEIGHT: 63 IN | OXYGEN SATURATION: 98 % | WEIGHT: 190 LBS | HEART RATE: 76 BPM | BODY MASS INDEX: 33.66 KG/M2 | SYSTOLIC BLOOD PRESSURE: 115 MMHG | RESPIRATION RATE: 18 BRPM | DIASTOLIC BLOOD PRESSURE: 71 MMHG | TEMPERATURE: 97.2 F

## 2024-10-02 PROCEDURE — 99024 POSTOP FOLLOW-UP VISIT: CPT | Performed by: OBSTETRICS & GYNECOLOGY

## 2024-10-02 PROCEDURE — NC001 PR NO CHARGE: Performed by: OBSTETRICS & GYNECOLOGY

## 2024-10-02 RX ORDER — DOCUSATE SODIUM 100 MG/1
100 CAPSULE, LIQUID FILLED ORAL 2 TIMES DAILY
Qty: 60 CAPSULE | Refills: 0 | Status: SHIPPED | OUTPATIENT
Start: 2024-10-02

## 2024-10-02 RX ORDER — FLUCONAZOLE 100 MG/1
200 TABLET ORAL ONCE
Status: COMPLETED | OUTPATIENT
Start: 2024-10-02 | End: 2024-10-02

## 2024-10-02 RX ORDER — IBUPROFEN 600 MG/1
600 TABLET, FILM COATED ORAL EVERY 6 HOURS
Qty: 30 TABLET | Refills: 0 | Status: SHIPPED | OUTPATIENT
Start: 2024-10-02

## 2024-10-02 RX ORDER — FERROUS SULFATE 324(65)MG
324 TABLET, DELAYED RELEASE (ENTERIC COATED) ORAL
Qty: 60 TABLET | Refills: 0 | Status: SHIPPED | OUTPATIENT
Start: 2024-10-02

## 2024-10-02 RX ORDER — ACETAMINOPHEN 325 MG/1
650 TABLET ORAL EVERY 4 HOURS PRN
Qty: 30 TABLET | Refills: 0 | Status: SHIPPED | OUTPATIENT
Start: 2024-10-02

## 2024-10-02 RX ADMIN — SERTRALINE HYDROCHLORIDE 50 MG: 50 TABLET ORAL at 08:27

## 2024-10-02 RX ADMIN — DOCUSATE SODIUM 100 MG: 100 CAPSULE, LIQUID FILLED ORAL at 08:27

## 2024-10-02 RX ADMIN — FLUCONAZOLE 200 MG: 100 TABLET ORAL at 06:00

## 2024-10-02 RX ADMIN — IBUPROFEN 600 MG: 600 TABLET, FILM COATED ORAL at 00:19

## 2024-10-02 NOTE — DISCHARGE SUMMARY
Discharge Summary - OB/GYN   Name: Janice Wallace 19 y.o. female I MRN: [id number]  Unit/Bed#: -01 I Date of Admission: 2024   Date of Service: 10/2/2024 I Hospital Day: 2    ADMISSION  Patient of:  Shira  Admission Date: 2024   Admitting Attending: Dr. Irving WALTER DO  Admitting Diagnoses:   Patient Active Problem List   Diagnosis    38 weeks gestation of pregnancy    Prenatal care, subsequent pregnancy, first trimester    Short interval between pregnancies affecting pregnancy in second trimester, antepartum    Severe obesity due to excess calories affecting pregnancy in third trimester (HCC)    Pelvic pain in pregnancy    Rash    Anemia affecting pregnancy in third trimester    Vaginal discharge during pregnancy in third trimester    Chlamydia infection affecting pregnancy in third trimester     uterine contractions in third trimester, antepartum    GBS (group b Streptococcus) UTI complicating pregnancy, third trimester    Chest pain during pregnancy    Amniotic fluid index borderline low    Hematuria    Encounter for elective induction of labor       DELIVERY  Delivery Method: Vaginal, Spontaneous   Delivery Date and Time: 2024 7:11 PM  Delivery Attending: Irving Joya    DISCHARGE  Discharge Date: 10/02/24   Discharge Attending: Dr. Ailyn Balbuena  Discharge Diagnosis:   Same, Delivered    Clinical course: Admission to Delivery  Janice Wallace is a 19 y.o.  who was admitted at 39w4d for induction of labor.    Reason for induction: elective.    Induction method: Pitocin    For pain control in labor, pt received epidural.  The labor course was not complicated. She progressed to complete and began pushing.    Delivery  Route of Delivery: Vaginal, Spontaneous      Anesthesia: Epidural,   QBL: Non-Surgical QBL (mL): 100          Delivery: Vaginal, Spontaneous at 2024 7:11 PM  Laceration: Perineal: 1° Repaired?  Yes    Baby's Weight:  3155 g (6 lb 15.3 oz); 111.29    Apgar scores: 9  and 9  at 1 and 5 minutes, respectively    Clinical Course: Post-Delivery:  The post delivery course was unremarkable.    On the day of discharge, the patient was ambulating, voiding spontaneously, tolerating oral intake, and hemodynamically stable. She was able to reasonably perform all ADLs. She had appropriate bowel function. Pain was well-controlled. She was discharged home on postpartum/postop day #2 without complications. Patient was instructed to follow up with her OB as an outpatient and was given appropriate warnings to call her provider with problems or concerns.    Pertinent lab findings included:   Blood type O+.     Last three Hgb values:  Lab Results   Component Value Date    HGB 8.1 (L) 10/01/2024    HGB 9.0 (L) 2024    HGB 9.6 (L) 2024        Problem-specific follow-up plans included the following:  Assessment & Plan  Encounter for elective induction of labor      Discharge med list:       Medication List      ASK your doctor about these medications     clotrimazole 1 % vaginal cream; Commonly known as: GYNE-LOTRIMIN; 1   applicatorful (about 5 grams) per vagina qHS x 14 days, then 1   applicatorful per vagina twice weekly.   sertraline 50 mg tablet; Commonly known as: Zoloft; Take 1 tablet (50 mg   total) by mouth daily   SLOW FE PO       Condition at discharge:   good     Disposition:   Home    Planned Readmission:   No    Discharge Statement:  I have spent a total time of 35 minutes in caring for this patient on the day of the visit/encounter. .

## 2024-10-02 NOTE — PLAN OF CARE
Problem: POSTPARTUM  Goal: Experiences normal postpartum course  Description: INTERVENTIONS:  - Monitor maternal vital signs  - Assess uterine involution and lochia  Outcome: Adequate for Discharge  Goal: Appropriate maternal -  bonding  Description: INTERVENTIONS:  - Identify family support  - Assess for appropriate maternal/infant bonding   -Encourage maternal/infant bonding opportunities  - Referral to  or  as needed  Outcome: Adequate for Discharge  Goal: Establishment of infant feeding pattern  Description: INTERVENTIONS:  - Assess breast/bottle feeding  - Refer to lactation as needed  Outcome: Adequate for Discharge  Goal: Incision(s), wounds(s) or drain site(s) healing without S/S of infection  Description: INTERVENTIONS  - Assess and document dressing, incision, wound bed, drain sites and surrounding tissue  - Provide patient and family education  - Perform skin care/dressing changes every   Outcome: Adequate for Discharge     Problem: PAIN - ADULT  Goal: Verbalizes/displays adequate comfort level or baseline comfort level  Description: Interventions:  - Encourage patient to monitor pain and request assistance  - Assess pain using appropriate pain scale  - Administer analgesics based on type and severity of pain and evaluate response  - Implement non-pharmacological measures as appropriate and evaluate response  - Consider cultural and social influences on pain and pain management  - Notify physician/advanced practitioner if interventions unsuccessful or patient reports new pain  Outcome: Adequate for Discharge     Problem: INFECTION - ADULT  Goal: Absence or prevention of progression during hospitalization  Description: INTERVENTIONS:  - Assess and monitor for signs and symptoms of infection  - Monitor lab/diagnostic results  - Monitor all insertion sites, i.e. indwelling lines, tubes, and drains  - Monitor endotracheal if appropriate and nasal secretions for changes in  amount and color  - [place] appropriate cooling/warming therapies per order  - Administer medications as ordered  - Instruct and encourage patient and family to use good hand hygiene technique  - Identify and instruct in appropriate isolation precautions for identified infection/condition  Outcome: Adequate for Discharge  Goal: Absence of fever/infection during neutropenic period  Description: INTERVENTIONS:  - Monitor WBC    Outcome: Adequate for Discharge     Problem: SAFETY ADULT  Goal: Patient will remain free of falls  Description: INTERVENTIONS:  - Educate patient/family on patient safety including physical limitations  - Instruct patient to call for assistance with activity   - Consult OT/PT to assist with strengthening/mobility   - Keep Call bell within reach  - Keep bed low and locked with side rails adjusted as appropriate  - Keep care items and personal belongings within reach  - Initiate and maintain comfort rounds  - Make Fall Risk Sign visible to staff  - Offer Toileting every  Hours, in advance of need  - Initiate/Maintain alarm  - Obtain necessary fall risk management equipment:   - Apply yellow socks and bracelet for high fall risk patients  - Consider moving patient to room near nurses station  Outcome: Adequate for Discharge  Goal: Maintain or return to baseline ADL function  Description: INTERVENTIONS:  -  Assess patient's ability to carry out ADLs; assess patient's baseline for ADL function and identify physical deficits which impact ability to perform ADLs (bathing, care of mouth/teeth, toileting, grooming, dressing, etc.)  - Assess/evaluate cause of self-care deficits   - Assess range of motion  - Assess patient's mobility; develop plan if impaired  - Assess patient's need for assistive devices and provide as appropriate  - Encourage maximum independence but intervene and supervise when necessary  - Involve family in performance of ADLs  - Assess for home care needs following discharge   -  Consider OT consult to assist with ADL evaluation and planning for discharge  - Provide patient education as appropriate  Outcome: Adequate for Discharge  Goal: Maintains/Returns to pre admission functional level  Description: INTERVENTIONS:  - Perform AM-PAC 6 Click Basic Mobility/ Daily Activity assessment daily.  - Set and communicate daily mobility goal to care team and patient/family/caregiver.   - Collaborate with rehabilitation services on mobility goals if consulted  - Perform Range of Motion  times a day.  - Reposition patient every  hours.  - Dangle patient  times a day  - Stand patient  times a day  - Ambulate patient  times a day  - Out of bed to chair  times a day   - Out of bed for meals  times a day  - Out of bed for toileting  - Record patient progress and toleration of activity level   Outcome: Adequate for Discharge     Problem: Knowledge Deficit  Goal: Patient/family/caregiver demonstrates understanding of disease process, treatment plan, medications, and discharge instructions  Description: Complete learning assessment and assess knowledge base.  Interventions:  - Provide teaching at level of understanding  - Provide teaching via preferred learning methods  Outcome: Adequate for Discharge     Problem: DISCHARGE PLANNING  Goal: Discharge to home or other facility with appropriate resources  Description: INTERVENTIONS:  - Identify barriers to discharge w/patient and caregiver  - Arrange for needed discharge resources and transportation as appropriate  - Identify discharge learning needs (meds, wound care, etc.)  - Arrange for interpretive services to assist at discharge as needed  - Refer to Case Management Department for coordinating discharge planning if the patient needs post-hospital services based on physician/advanced practitioner order or complex needs related to functional status, cognitive ability, or social support system  Outcome: Adequate for Discharge

## 2024-10-02 NOTE — UTILIZATION REVIEW
"Mother and baby discharged on 10//2024     NOTIFICATION OF INPATIENT ADMISSION   MATERNITY/DELIVERY AUTHORIZATION REQUEST   SERVICING FACILITY:   [facility] - L&D, [place], NICU  [address] [place] [address]  Tax ID: [id number]  NPI: [id number] ATTENDING PROVIDER:  Attending Name and NPI#: Irving WALTER Do [[id number]]  Address: [address] [place] [address]  Phone: [phone]     ADMISSION INFORMATION:  Place of Service: Inpatient [facility]  Place of Service Code: 21  Inpatient Admission Date/Time: 24  7:48 AM  Discharge Date/Time: 10/2/2024 11:35 AM  Admitting Diagnosis Code/Description:  Pregnancy [Z34.90]     Mother: Janice Wallace 2005 Estimated Date of Delivery: 10/3/24  Delivering clinician: Irving Joya   OB History          3    Para   2    Term   2            AB   1    Living   2         SAB   1    IAB        Ectopic        Multiple   0    Live Births   2                Name & MRN:   Information for the patient's :  ExpositoDeleon, Baby Boy (Janice) [[id number]]   [place] Delivery Information:  Sex: male  Delivered 2024 7:11 PM by Vaginal, Spontaneous; Gestational Age: 39w4d    [place] Measurements:  Weight: 6 lb 15.3 oz (3155 g);  Height: 20\"    APGAR 1 minute 5 minutes 10 minutes   Totals: 9 9       UTILIZATION REVIEW CONTACT:  Jane Odonnell Utilization   Network Utilization Review Department  Phone: [phone]  Fax [phone]  Email: [email or web address]  Contact for approvals/pending authorizations, clinical reviews, and discharge.     PHYSICIAN ADVISORY SERVICES:  Medical Necessity Denial & Qqfq-xo-Tmnd Review  Phone: [phone]  Fax: [phone]  Email: [email or web address]     DISCHARGE SUPPORT TEAM:  For Patients Discharge Needs & Updates  Phone: [phone] opt. 2 Fax: [phone]  Email: " [email or web address]

## 2024-10-02 NOTE — PROGRESS NOTES
Progress Note - OB/GYN   Name: Janice Wallace 19 y.o. female I MRN: [id number]  Unit/Bed#: -01 I Date of Admission: 9/30/2024   Date of Service: 10/2/2024 I Hospital Day: 2     Assessment & Plan  Encounter for elective induction of labor    Vaginal delivery  Doing well. OK for Discharge. Iron for anemia.    OB Post-Partum Progress Note  Subjective   Post delivery. Patient is doing well. Lochia WNL. Pain well controlled.     Pain: yes, cramping, improved with meds  Tolerating PO: yes  Voiding: yes  Ambulating: yes  Breastfeeding:  no  Chest pain: no  Shortness of breath: no  Leg pain: no  Lochia: WNL    Objective :  Temp:  [97.2 °F (36.2 °C)-98.7 °F (37.1 °C)] 97.2 °F (36.2 °C)  HR:  [76-96] 76  BP: (111-137)/(60-85) 115/71  Resp:  [18] 18  SpO2:  [96 %-98 %] 98 %  O2 Device: None (Room air)    Physical Exam  Constitutional:       Appearance: Normal appearance.   HENT:      Head: Normocephalic and atraumatic.   Pulmonary:      Effort: Pulmonary effort is normal.   Abdominal:      Palpations: Abdomen is soft. There is no mass.      Tenderness: There is no abdominal tenderness. There is no guarding or rebound.   Musculoskeletal:         General: Normal range of motion.   Neurological:      Mental Status: She is alert and oriented to person, place, and time.   Skin:     General: Skin is warm and dry.   Psychiatric:         Mood and Affect: Mood normal.         Behavior: Behavior normal.         Thought Content: Thought content normal.         Judgment: Judgment normal.            Lab Results: I have reviewed the following results:  Lab Results   Component Value Date    WBC 13.25 (H) 10/01/2024    HGB 8.1 (L) 10/01/2024    HCT 27.6 (L) 10/01/2024    MCV 71 (L) 10/01/2024     10/01/2024

## 2024-10-02 NOTE — LACTATION NOTE
This note was copied from a baby's chart.  Met with parents to follow up and discuss the Breastfeeding Discharge Booklet with plans for discharge.    Mother discussed that baby is latching and feeding at the breast, but remains searching after feedings. She was encouraged to offer both breast for feedings and may supplement if baby is looking for more. Offered reassurance in mature milk transitioning and increasing in volume as more stimulation occurs.    Discussed the importance of ensuring that baby feeds 8-12x in 24 hours and that baby has 6 wet diapers or more that are becoming more dilute as well as soiled diapers that are transitioning demonstrated by color change from meconium to a yellow/gold seedy loose stool by day 5.    Mother was given resources to look up medications to ensure they are safe with breastfeeding, by communicating with the [facility], [facility], [facility] as well as using [employer] (assisted mother to pin to home screen on personal phone).    Discussed engorgement time frame (when mature milk comes in) and management as well as how to deal with conditions that may occur while breastfeeding (plugged ducts, milk blebs and mastitis) and when is appropriate to communicate with her OB/GYN and/or a lactation consultant.    Mother is aware of how to set up a pump, how to cycle (stimulation vs expression phases during a pumping session), importance of flange fit and trying different sizes to ensure best fit, milk storage and how to properly clean parts. Discussed handouts for tips on pumping when returning to work and paced bottle feeding.    Discussed community resources for continued support in breastfeeding once discharged home. She was encouraged to communicate with [facility], insurance for lactation home visits and/or with her baby's pediatrician for lactation support/services that could be offered in the practice or close to home.    Parents were encouraged to call  for further questions that arise prior to discharge.    Education and encounter occurred in Guamanian, primary language of parents and only language that father of the baby is able to speak.

## 2024-10-02 NOTE — PLAN OF CARE

## 2024-10-10 LAB — PLACENTA IN STORAGE: NORMAL

## 2024-10-15 ENCOUNTER — TELEPHONE (OUTPATIENT)
Dept: OBGYN CLINIC | Facility: CLINIC | Age: 19
End: 2024-10-15

## 2024-10-15 NOTE — TELEPHONE ENCOUNTER
POSTPARTUM PHONE CALL ASSESSMENT - left message patient's VM to recall office for postpartum call & to schedule postpartum appointment with provider    Date of Delivery: 2024  Delivering Provider: Dr Irving Joya  Mode:   Delivery Notes/Complications: induction  Do you still have bleeding/pain? If so, how much/how severe?   Regular BMs/Urination?   Breastfeeding/Formula/Both?   How are you doing emotionally?        If struggling, obtain a EPDS Score:   Do you have any other questions or concerns for us or your provider?   Have you scheduled the pediatrician appointment with pediatrician?   Do you have a postpartum visit scheduled? NO  Date scheduled:      Provider:

## 2024-10-22 PROBLEM — B95.1 GBS (GROUP B STREPTOCOCCUS) UTI COMPLICATING PREGNANCY, THIRD TRIMESTER: Status: RESOLVED | Noted: 2024-09-03 | Resolved: 2024-10-22

## 2024-10-22 PROBLEM — O23.43 GBS (GROUP B STREPTOCOCCUS) UTI COMPLICATING PREGNANCY, THIRD TRIMESTER: Status: RESOLVED | Noted: 2024-09-03 | Resolved: 2024-10-22

## 2024-10-26 DIAGNOSIS — O99.343 DEPRESSION DURING PREGNANCY IN THIRD TRIMESTER: ICD-10-CM

## 2024-10-26 DIAGNOSIS — F32.A DEPRESSION DURING PREGNANCY IN THIRD TRIMESTER: ICD-10-CM

## 2024-11-08 ENCOUNTER — TELEPHONE (OUTPATIENT)
Dept: OBGYN CLINIC | Facility: CLINIC | Age: 19
End: 2024-11-08

## 2024-11-08 NOTE — TELEPHONE ENCOUNTER
11/8/24 pt called back, offer multiple appointment dates patient decline can only do after 4:30 appt.

## 2024-11-08 NOTE — TELEPHONE ENCOUNTER
Pt delivered on 9/30/2024 and has not been seen for post Partum visit.  Spoke with patient to assist with scheduling PP visit, transferred to reception to schedule PP visit.    Pt has PP visit on 12/9/24

## 2024-12-09 ENCOUNTER — POSTPARTUM VISIT (OUTPATIENT)
Dept: OBGYN CLINIC | Facility: CLINIC | Age: 19
End: 2024-12-09
Payer: COMMERCIAL

## 2024-12-09 VITALS
SYSTOLIC BLOOD PRESSURE: 120 MMHG | BODY MASS INDEX: 29.77 KG/M2 | WEIGHT: 168 LBS | HEIGHT: 63 IN | DIASTOLIC BLOOD PRESSURE: 82 MMHG

## 2024-12-09 DIAGNOSIS — Z32.02 NEGATIVE PREGNANCY TEST: ICD-10-CM

## 2024-12-09 DIAGNOSIS — Z30.09 BIRTH CONTROL COUNSELING: Primary | ICD-10-CM

## 2024-12-09 LAB — SL AMB POCT URINE HCG: NORMAL

## 2024-12-09 PROCEDURE — 81025 URINE PREGNANCY TEST: CPT | Performed by: OBSTETRICS & GYNECOLOGY

## 2024-12-09 PROCEDURE — 99213 OFFICE O/P EST LOW 20 MIN: CPT | Performed by: OBSTETRICS & GYNECOLOGY

## 2024-12-09 RX ORDER — ACETAMINOPHEN AND CODEINE PHOSPHATE 120; 12 MG/5ML; MG/5ML
1 SOLUTION ORAL DAILY
Qty: 28 TABLET | Refills: 5 | Status: SHIPPED | OUTPATIENT
Start: 2024-12-09

## 2024-12-09 NOTE — PROGRESS NOTES
"[facility] [address], [address], [place] [address]    Assessment/Plan:  Janice is a 19 y.o. year old  who presents for postpartum visit.    Routine Postpartum Care  Normal postpartum exam  Contraception: oral progesterone-only contraceptive  Depression Screen: 0  Feeding: breastfeeding  Cervical cancer screening Up to Date, next due now  Follow up in: 3 months for annual exam or as needed.  Assessment & Plan  Birth control counseling  Discussed IUD, mini pill, depo. Pt desires mini pill. Discussed importance of back up contraception for at least 1 mo and to take pill same time everyday.     Orders:    norethindrone (Daniela) 0.35 MG tablet; Take 1 tablet (0.35 mg total) by mouth daily    Subjective:     CC: Postpartum visit    Janice Wallace is a 19 y.o. y.o. female  who presents for a postpartum visit.     She is 8 weeks postpartum following a  on 24 at 39.4 weeks. Postpartum course has been uncomplicated.     Bleeding no bleeding. Bowel function is normal. Bladder function is normal. Patient is sexually active.     Postpartum Depression: Low Risk  (2024)    [place]  Depression Scale     Last EPDS Total Score: 0     Last EPDS Self Harm Result: Never       The following portions of the patient's history were reviewed and updated as appropriate: allergies, current medications, past family history, past medical history, obstetric history, gynecologic history, past social history, past surgical history and problem list.    Objective:  /82 (BP Location: Right arm, Patient Position: Sitting, Cuff Size: Standard)   Ht 5' 3\" (1.6 m)   Wt 76.2 kg (168 lb)   Breastfeeding Yes   BMI 29.76 kg/m²   Pregravid Weight/BMI: No episode found (BMI Could not be calculated)  Current Weight: 76.2 kg (168 lb)   Total Weight Gain: Not found.   Pre- Vitals      Flowsheet Row Most Recent Value   Prenatal Assessment    Prenatal Vitals    Blood " Pressure 120/82   Weight - Scale 76.2 kg (168 lb)   Urine Albumin/Glucose    Dilation/Effacement/Station    Vaginal Drainage    Edema                General Appearance: alert and oriented, in no acute distress.   Abdomen: Soft, non-tender, non-distended, no masses, no rebound or guarding.  Pelvic:       External genitalia: Normal appearance, no abnormal pigmentation, no lesions or masses. Normal Bartholin's and [place]'s. Obstetric laceration well-healed.       Urinary system: Urethral meatus normal, bladder non-tender.      Vaginal: normal mucosa without prolapse or lesions. Normal-appearing physiologic discharge.  Extremities: Normal range of motion.   Skin: normal, no rash or abnormalities  Neurologic: alert, oriented x3  Psychiatric: Appropriate affect, mood stable, cooperative with exam.        Irving Joya DO  12/9/2024 5:11 PM

## 2024-12-10 ENCOUNTER — TELEPHONE (OUTPATIENT)
Dept: OBGYN CLINIC | Facility: CLINIC | Age: 19
End: 2024-12-10

## 2025-04-02 ENCOUNTER — ANNUAL EXAM (OUTPATIENT)
Dept: OBGYN CLINIC | Facility: CLINIC | Age: 20
End: 2025-04-02
Payer: COMMERCIAL

## 2025-04-02 VITALS
HEIGHT: 63 IN | WEIGHT: 182 LBS | SYSTOLIC BLOOD PRESSURE: 112 MMHG | DIASTOLIC BLOOD PRESSURE: 70 MMHG | BODY MASS INDEX: 32.25 KG/M2

## 2025-04-02 DIAGNOSIS — Z32.00 PREGNANCY EXAMINATION OR TEST, PREGNANCY UNCONFIRMED: Primary | ICD-10-CM

## 2025-04-02 DIAGNOSIS — B37.31 VAGINAL YEAST INFECTION: ICD-10-CM

## 2025-04-02 DIAGNOSIS — Z01.419 ENCOUNTER FOR GYNECOLOGICAL EXAMINATION WITHOUT ABNORMAL FINDING: ICD-10-CM

## 2025-04-02 LAB — SL AMB POCT URINE HCG: NEGATIVE

## 2025-04-02 PROCEDURE — 99395 PREV VISIT EST AGE 18-39: CPT | Performed by: NURSE PRACTITIONER

## 2025-04-02 PROCEDURE — 81025 URINE PREGNANCY TEST: CPT | Performed by: NURSE PRACTITIONER

## 2025-04-02 RX ORDER — NYSTATIN AND TRIAMCINOLONE ACETONIDE 100000; 1 [USP'U]/G; MG/G
CREAM TOPICAL 2 TIMES DAILY
Qty: 30 G | Refills: 1 | Status: SHIPPED | OUTPATIENT
Start: 2025-04-02

## 2025-04-02 RX ORDER — FLUCONAZOLE 150 MG/1
TABLET ORAL
Qty: 2 TABLET | Refills: 1 | Status: SHIPPED | OUTPATIENT
Start: 2025-04-02 | End: 2025-04-05

## 2025-04-02 NOTE — PROGRESS NOTES
[facility] - [place]  [address] [place] [id number]    ASSESSMENT/PLAN: Janice Wallace is a 19 y.o.  who presents for annual gynecologic exam.    Encounter for routine gynecologic examination  - Routine well gynecologic exam completed today.  - Cervical Cancer Screening: Current ASCCP Guidelines reviewed. Last Pap: Not on file Never had pap smear. History of abnormal: No.  - HPV Vaccination status: Immunization series complete  - STI screening offered including HIV testing: GC/CT done, others declined  - Contraceptive counseling discussed.  Current contraception: oral progesterone-only contraceptive.   - The following were reviewed in today's visit: breast self exam, family planning choices, exercise, and healthy diet    Additional problems addressed during this visit:  Assessment & Plan  Encounter for gynecological examination without abnormal finding  Normal gyn exam  Discussed alternate forms of contraception, happy with POPs until breastfeeding is complete then will change to contraceptive patch. Pt will call when ready.     Orders:    IGP, CtNg, rfx Aptima HPV ASCU    Pregnancy examination or test, pregnancy unconfirmed  Negative  Discussed strict on time dosing of POPs  Orders:    POCT urine HCG    Vaginal yeast infection  Yeast infection evident on exam, Rx to pharmacy  Orders:    fluconazole (DIFLUCAN) 150 mg tablet; Take 1 tablet, if symptoms not completely resolved in 3 days, take 2nd tablet.    nystatin-triamcinolone (MYCOLOG-II) cream; Apply topically 2 (two) times a day    CC:  Annual Gynecologic Examination    HPI: Janice Wallace is a 19 y.o.  who presents for annual gynecologic examination. Doing well since delivery in September, no complaints. Baby thriving, breastfeeding. Has help at home,  very supportive and helpful. Feels safe. Stopped taking Zoloft, doing well without medication and denies any concerns with anxiety or  depression.  Bowel and bladder function is normal.  Using POPs for contraception, happy, no bleeding.     ROS: Negative except as noted in HPI    No LMP recorded.       She  reports being sexually active and has had partner(s) who are male. She reports using the following method of birth control/protection: OCP.       The following portions of the patient's history were reviewed and updated as appropriate:   History reviewed. No pertinent past medical history.  History reviewed. No pertinent surgical history.  Family History   Problem Relation Age of Onset    No Known Problems Mother     Diabetes type II Father     No Known Problems Sister     No Known Problems Sister     No Known Problems Brother     No Known Problems Daughter     Diabetes type II Maternal Grandmother     No Known Problems Maternal Grandfather     Diabetes type II Paternal Grandmother     Lung cancer Paternal Grandfather      Social History     Socioeconomic History    Marital status: /Civil Union     Spouse name: None    Number of children: None    Years of education: None    Highest education level: None   Occupational History    None   Tobacco Use    Smoking status: Never     Passive exposure: Never    Smokeless tobacco: Never   Vaping Use    Vaping status: Never Used   Substance and Sexual Activity    Alcohol use: Not Currently    Drug use: Never    Sexual activity: Yes     Partners: Male     Birth control/protection: OCP     Comment: no new partner in past year   Other Topics Concern    None   Social History Narrative    None     Social Drivers of Health     Financial Resource Strain: Not on file   Food Insecurity: No Food Insecurity (10/1/2024)    Nursing - Inadequate Food Risk Classification     Worried About Running Out of Food in the Last Year: Never true     Ran Out of Food in the Last Year: Never true     Ran Out of Food in the Last Year: Not on file   Transportation Needs: No Transportation Needs (10/1/2024)    PRAPARE -  "Transportation     Lack of Transportation (Medical): No     Lack of Transportation (Non-Medical): No   Physical Activity: Not on file   Stress: Not on file   Social Connections: Not on file   Intimate Partner Violence: Not on file   Housing Stability: Low Risk  (10/1/2024)    Housing Stability Vital Sign     Unable to Pay for Housing in the Last Year: No     Number of Times Moved in the Last Year: 0     Homeless in the Last Year: No     Outpatient Medications Marked as Taking for the 4/2/25 encounter (Annual Exam) with HUMBERTO Scott   Medication    fluconazole (DIFLUCAN) 150 mg tablet    norethindrone (Daniela) 0.35 MG tablet    nystatin-triamcinolone (MYCOLOG-II) cream     No Known Allergies        Objective:  /70 (BP Location: Left arm, Patient Position: Sitting, Cuff Size: Large)   Ht 5' 2.5\" (1.588 m)   Wt 82.6 kg (182 lb)   Breastfeeding Yes   BMI 32.76 kg/m²        Chaperone present? Declines.    General Appearance: alert and oriented, in no acute distress.   Neck/Thyroid: No thyromegaly, no thyroid nodules.  Respiratory: Unlabored breathing.  Cardiovascular: Regular rate, no peripheral edema.  Abdomen: Soft, non-tender, non-distended, no masses, no rebound or guarding.  Breast Exam: No dimpling, nipple retraction or discharge. No lumps or masses. No axillary or supraclavicular nodes.   Pelvic:       External genitalia: Normal appearance, no abnormal pigmentation, no lesions or masses. Normal Bartholin's and [place]'s. Erythema noted in groin area. Pt states itchy.      Urinary system: Urethral meatus normal, bladder non-tender.      Vaginal: normal mucosa without prolapse or lesions. Large amount of curdy discharge noted.      Cervix: Normal-appearing, well-epithelialized, no gross lesions or masses. No cervical motion tenderness.      Adnexa: No adnexal masses or tenderness noted.      Uterus: Normal-sized, regular contour, midline, mobile, no uterine tenderness.  Extremities: Normal range " of motion. Warm, well-perfused, non-tender.   Skin: normal, no rash or abnormalities  Neurologic: alert, oriented x3  Psychiatric: Appropriate affect, mood stable, cooperative with exam.        HUMBERTO Scott  4/2/2025 6:11 PM

## 2025-04-05 LAB
C TRACH RRNA CVX QL NAA+PROBE: NEGATIVE
CYTOLOGIST CVX/VAG CYTO: NORMAL
DX ICD CODE: NORMAL
N GONORRHOEA RRNA CVX QL NAA+PROBE: NEGATIVE
OTHER STN SPEC: NORMAL
OTHER STN SPEC: NORMAL
PATH REPORT.FINAL DX SPEC: NORMAL
SL AMB NOTE:: NORMAL
SL AMB SPECIMEN ADEQUACY: NORMAL
SL AMB TEST METHODOLOGY: NORMAL

## 2025-04-07 ENCOUNTER — RESULTS FOLLOW-UP (OUTPATIENT)
Dept: OBGYN CLINIC | Facility: CLINIC | Age: 20
End: 2025-04-07

## 2025-05-05 ENCOUNTER — OFFICE VISIT (OUTPATIENT)
Dept: FAMILY MEDICINE CLINIC | Facility: HOSPITAL | Age: 20
End: 2025-05-05
Payer: COMMERCIAL

## 2025-05-05 VITALS
HEART RATE: 69 BPM | TEMPERATURE: 98.7 F | SYSTOLIC BLOOD PRESSURE: 115 MMHG | HEIGHT: 63 IN | WEIGHT: 176.8 LBS | BODY MASS INDEX: 31.33 KG/M2 | DIASTOLIC BLOOD PRESSURE: 80 MMHG | OXYGEN SATURATION: 98 %

## 2025-05-05 DIAGNOSIS — E66.9 OBESITY (BMI 30-39.9): ICD-10-CM

## 2025-05-05 DIAGNOSIS — J30.2 SEASONAL ALLERGIES: ICD-10-CM

## 2025-05-05 DIAGNOSIS — R06.2 WHEEZING: Primary | ICD-10-CM

## 2025-05-05 PROCEDURE — 99214 OFFICE O/P EST MOD 30 MIN: CPT

## 2025-05-05 RX ORDER — FLUTICASONE PROPIONATE 50 MCG
2 SPRAY, SUSPENSION (ML) NASAL DAILY PRN
Qty: 15.8 ML | Refills: 1 | Status: SHIPPED | OUTPATIENT
Start: 2025-05-05

## 2025-05-05 RX ORDER — ALBUTEROL SULFATE 90 UG/1
2 INHALANT RESPIRATORY (INHALATION) EVERY 6 HOURS PRN
Qty: 18 G | Refills: 5 | Status: SHIPPED | OUTPATIENT
Start: 2025-05-05

## 2025-05-05 RX ORDER — CETIRIZINE HYDROCHLORIDE 10 MG/1
10 TABLET ORAL DAILY PRN
Qty: 60 TABLET | Refills: 1 | Status: SHIPPED | OUTPATIENT
Start: 2025-05-05

## 2025-05-05 NOTE — PROGRESS NOTES
"Name: Janice Wallace      : 2005      MRN: [id number]  Encounter Provider: Edwin Honeycutt MD  Encounter Date: 2025   Encounter department: [facility] [address]  :  Assessment & Plan  Wheezing  Asymptomatic, normal exam, denies known history of asthma, will treat with albuterol and send for PFT testing  Orders:    albuterol (Ventolin HFA) 90 mcg/act inhaler; Inhale 2 puffs every 6 (six) hours as needed for wheezing    Complete PFT with post bronchodilator; Future    Seasonal allergies  Symptoms of congestion and cough for 1+ month  Orders:    cetirizine (ZyrTEC) 10 mg tablet; Take 1 tablet (10 mg total) by mouth daily as needed for allergies    fluticasone (FLONASE) 50 mcg/act nasal spray; 2 sprays into each nostril daily as needed for allergies    CBC and differential; Future    Obesity (BMI 30-39.9)      Obesity labs ordered, return for annual physical for further counseling    Orders:    TSH, 3rd generation with Free T4 reflex; Future    Hemoglobin A1C; Future    Lipid Panel with Direct LDL reflex; Future    CBC and differential; Future    Comprehensive metabolic panel; Future           History of Present Illness   HPI  Wheezing for about a month, intermittent  Review of Systems   Constitutional:  Negative for chills and fever.   HENT:  Positive for congestion. Negative for ear pain and rhinorrhea.    Respiratory:  Positive for cough and wheezing. Negative for shortness of breath.    Cardiovascular:  Negative for chest pain.       Objective   /80   Pulse 69   Temp 98.7 °F (37.1 °C)   Ht 5' 3\" (1.6 m)   Wt 80.2 kg (176 lb 12.8 oz)   SpO2 98%   BMI 31.32 kg/m²      Physical Exam  Constitutional:       General: She is not in acute distress.     Appearance: Normal appearance. She is obese. She is not ill-appearing, toxic-appearing or diaphoretic.   HENT:      Head: Normocephalic.      Right Ear: Tympanic membrane, ear canal and external ear " normal. There is no impacted cerumen.      Left Ear: Tympanic membrane, ear canal and external ear normal. There is no impacted cerumen.      Mouth/Throat:      Mouth: Mucous membranes are moist.      Pharynx: Oropharynx is clear.   Eyes:      Conjunctiva/sclera: Conjunctivae normal.   Cardiovascular:      Rate and Rhythm: Normal rate and regular rhythm.      Heart sounds: Normal heart sounds. No murmur heard.  Pulmonary:      Effort: Pulmonary effort is normal. No respiratory distress.      Breath sounds: Normal breath sounds. No wheezing.   Neurological:      Mental Status: She is alert and oriented to person, place, and time.   Psychiatric:         Mood and Affect: Mood normal.         Behavior: Behavior normal.

## 2025-05-09 ENCOUNTER — NURSE TRIAGE (OUTPATIENT)
Age: 20
End: 2025-05-09

## 2025-05-09 NOTE — TELEPHONE ENCOUNTER
"FOLLOW UP: Appointment scheduled 5/13    REASON FOR CONVERSATION: Vaginal Itching    SYMPTOMS: vaginal itching, green vaginal discharge, vaginal burning    OTHER: Patient called office complaining of vaginal itching, green vaginal discharge, vaginal burning that started about 2 days ago. She states she was treated with Diflucan the beginning of April for an infection and only had white discharge that resolved. She denies abdominal pain, back pain, fever, pain with urination, odor or any other symptoms to note at this time. Scheduled appointment 5/13.  Offered 5/12 appointment, patient unable to schedule this day. Advised patient wear cotton underwear, keep area clean and dry. Avoid using feminine hygiene products, scented soaps, and anything in vagina until symptoms resolve. Will reach out to provider for recommendations in meantime per patient request.     DISPOSITION: See Within 3 Days in Office          Reason for Disposition   Abnormal color vaginal discharge (i.e., yellow, green, gray)    Answer Assessment - Initial Assessment Questions  1. SYMPTOM: \"What's the main symptom you're concerned about?\" (e.g., pain, itching, dryness)      Vaginal itching, vaginal burning   2. LOCATION: \"Where is the  symptoms located?\" (e.g., inside/outside, left/right)      Itching inside vagina   3. ONSET: \"When did the  symptoms  start?\"      2 days ago   4. PAIN: \"Is there any pain?\" If Yes, ask: \"How bad is it?\" (Scale: 1-10; mild, moderate, severe)      None   5. ITCHING: \"Is there any itching?\" If Yes, ask: \"How bad is it?\" (Scale: 1-10; mild, moderate, severe)      8  6. CAUSE: \"What do you think is causing the discharge?\" \"Have you had the same problem before?\" \"What happened then?\"      Had diflucan ordered early in April   7. OTHER SYMPTOMS: \"Do you have any other symptoms?\" (e.g., fever, itching, vaginal bleeding, pain with urination, injury to genital area, vaginal foreign body)      Green vaginal discharge   8. " "PREGNANCY: \"Is there any chance you are pregnant?\" \"When was your last menstrual period?\"      No. LMP: no period since giving birth. Breastfeeding    Answer Assessment - Initial Assessment Questions  1. DISCHARGE: \"Describe the discharge.\" (e.g., white, yellow, green, gray, foamy, cottage cheese-like)      green  2. ODOR: \"Is there a bad odor?\"      no  3. ONSET: \"When did the discharge begin?\"      2 days ago    5. ABDOMEN PAIN: \"Are you having any abdomen pain?\" If Yes, ask: \"What does it feel like? \" (e.g., crampy, dull, intermittent, constant)       None   6. ABDOMEN PAIN SEVERITY: If present, ask: \"How bad is it?\" (e.g., Scale 1-10; mild, moderate, or severe)      None   7. CAUSE: \"What do you think is causing the discharge?\" \"Have you had the same problem before?\" \"What happened then?\"      Unknown   8. OTHER SYMPTOMS: \"Do you have any other symptoms?\" (e.g., fever, itching, vaginal bleeding, pain with urination, injury to genital area, vaginal foreign body)      Itching, burning   9. PREGNANCY: \"Is there any chance you are pregnant?\" \"When was your last menstrual period?\"      No period since giving birth, breastfeeding    Protocols used: Vaginal Symptoms-Adult-OH, Vaginal Discharge-Adult-OH    "

## 2025-05-12 NOTE — TELEPHONE ENCOUNTER
Called patient and advised she can wait to be see until her appointment tomorrow in office, She verbalized understanding. Confirmed appointment time and location.

## 2025-05-14 ENCOUNTER — RESULTS FOLLOW-UP (OUTPATIENT)
Dept: FAMILY MEDICINE CLINIC | Facility: HOSPITAL | Age: 20
End: 2025-05-14

## 2025-05-14 LAB
ALBUMIN SERPL-MCNC: 4.7 G/DL (ref 4–5)
ALP SERPL-CCNC: 78 IU/L (ref 44–121)
ALT SERPL-CCNC: 7 IU/L (ref 0–32)
AST SERPL-CCNC: 12 IU/L (ref 0–40)
BASOPHILS # BLD AUTO: 0 X10E3/UL (ref 0–0.2)
BASOPHILS NFR BLD AUTO: 0 %
BILIRUB SERPL-MCNC: 0.3 MG/DL (ref 0–1.2)
BUN SERPL-MCNC: 12 MG/DL (ref 6–20)
BUN/CREAT SERPL: 23 (ref 9–23)
CALCIUM SERPL-MCNC: 9.4 MG/DL (ref 8.7–10.2)
CHLORIDE SERPL-SCNC: 106 MMOL/L (ref 96–106)
CHOLEST SERPL-MCNC: 134 MG/DL (ref 100–199)
CO2 SERPL-SCNC: 19 MMOL/L (ref 20–29)
CREAT SERPL-MCNC: 0.52 MG/DL (ref 0.57–1)
EGFR: 135 ML/MIN/1.73
EOSINOPHIL # BLD AUTO: 0.4 X10E3/UL (ref 0–0.4)
EOSINOPHIL NFR BLD AUTO: 5 %
ERYTHROCYTE [DISTWIDTH] IN BLOOD BY AUTOMATED COUNT: 13.7 % (ref 11.7–15.4)
EST. AVERAGE GLUCOSE BLD GHB EST-MCNC: 103 MG/DL
GLOBULIN SER-MCNC: 2.6 G/DL (ref 1.5–4.5)
GLUCOSE SERPL-MCNC: 86 MG/DL (ref 70–99)
HBA1C MFR BLD: 5.2 % (ref 4.8–5.6)
HCT VFR BLD AUTO: 39.9 % (ref 34–46.6)
HDLC SERPL-MCNC: 41 MG/DL
HGB BLD-MCNC: 12.6 G/DL (ref 11.1–15.9)
IMM GRANULOCYTES # BLD: 0.1 X10E3/UL (ref 0–0.1)
IMM GRANULOCYTES NFR BLD: 1 %
LDLC SERPL CALC-MCNC: 81 MG/DL (ref 0–99)
LDLC/HDLC SERPL: 2 RATIO (ref 0–3.2)
LYMPHOCYTES # BLD AUTO: 3.8 X10E3/UL (ref 0.7–3.1)
LYMPHOCYTES NFR BLD AUTO: 46 %
MCH RBC QN AUTO: 25.7 PG (ref 26.6–33)
MCHC RBC AUTO-ENTMCNC: 31.6 G/DL (ref 31.5–35.7)
MCV RBC AUTO: 81 FL (ref 79–97)
MONOCYTES # BLD AUTO: 0.6 X10E3/UL (ref 0.1–0.9)
MONOCYTES NFR BLD AUTO: 8 %
NEUTROPHILS # BLD AUTO: 3.3 X10E3/UL (ref 1.4–7)
NEUTROPHILS NFR BLD AUTO: 40 %
PLATELET # BLD AUTO: 283 X10E3/UL (ref 150–450)
POTASSIUM SERPL-SCNC: 4.2 MMOL/L (ref 3.5–5.2)
PROT SERPL-MCNC: 7.3 G/DL (ref 6–8.5)
RBC # BLD AUTO: 4.91 X10E6/UL (ref 3.77–5.28)
SL AMB VLDL CHOLESTEROL CALC: 12 MG/DL (ref 5–40)
SODIUM SERPL-SCNC: 141 MMOL/L (ref 134–144)
TRIGL SERPL-MCNC: 58 MG/DL (ref 0–149)
TSH SERPL DL<=0.005 MIU/L-ACNC: 2.2 UIU/ML (ref 0.45–4.5)
WBC # BLD AUTO: 8.2 X10E3/UL (ref 3.4–10.8)

## 2025-06-01 DIAGNOSIS — Z30.09 BIRTH CONTROL COUNSELING: ICD-10-CM

## 2025-06-02 RX ORDER — NORETHINDRONE 0.35 MG/1
1 TABLET ORAL DAILY
Qty: 28 TABLET | Refills: 5 | Status: SHIPPED | OUTPATIENT
Start: 2025-06-02

## 2025-07-01 ENCOUNTER — TELEPHONE (OUTPATIENT)
Dept: OTHER | Facility: OTHER | Age: 20
End: 2025-07-01

## 2025-07-01 NOTE — TELEPHONE ENCOUNTER
"Patient came for procedure at scheduled time. She spoke with  who \"called several different numbers but got no answer.\" Patient is calling from her car asking if there is a way to get hold of anyone or does she need to reschedule.     Did reach out to [facility]; per group she will have to reschedule.       "

## 2025-07-08 ENCOUNTER — TELEPHONE (OUTPATIENT)
Age: 20
End: 2025-07-08

## 2025-07-09 DIAGNOSIS — J30.2 SEASONAL ALLERGIES: ICD-10-CM

## 2025-07-10 RX ORDER — FLUTICASONE PROPIONATE 50 MCG
SPRAY, SUSPENSION (ML) NASAL
Qty: 16 ML | Refills: 5 | Status: SHIPPED | OUTPATIENT
Start: 2025-07-10

## 2025-07-22 ENCOUNTER — HOSPITAL ENCOUNTER (OUTPATIENT)
Dept: PULMONOLOGY | Facility: HOSPITAL | Age: 20
Discharge: HOME | End: 2025-07-22
Payer: COMMERCIAL

## 2025-07-22 DIAGNOSIS — R06.2 WHEEZING: ICD-10-CM

## 2025-07-22 PROCEDURE — 94729 DIFFUSING CAPACITY: CPT | Performed by: INTERNAL MEDICINE

## 2025-07-22 PROCEDURE — 94760 N-INVAS EAR/PLS OXIMETRY 1: CPT

## 2025-07-22 PROCEDURE — 94729 DIFFUSING CAPACITY: CPT

## 2025-07-22 PROCEDURE — 94726 PLETHYSMOGRAPHY LUNG VOLUMES: CPT | Performed by: INTERNAL MEDICINE

## 2025-07-22 PROCEDURE — 94060 EVALUATION OF WHEEZING: CPT | Performed by: INTERNAL MEDICINE

## 2025-07-22 PROCEDURE — 94060 EVALUATION OF WHEEZING: CPT

## 2025-07-22 PROCEDURE — 94726 PLETHYSMOGRAPHY LUNG VOLUMES: CPT

## 2025-07-22 RX ORDER — ALBUTEROL SULFATE 0.83 MG/ML
2.5 SOLUTION RESPIRATORY (INHALATION) ONCE
Status: COMPLETED | OUTPATIENT
Start: 2025-07-22 | End: 2025-07-22

## 2025-07-22 RX ADMIN — ALBUTEROL SULFATE 2.5 MG: 2.5 SOLUTION RESPIRATORY (INHALATION) at 17:22

## 2025-08-21 ENCOUNTER — OFFICE VISIT (OUTPATIENT)
Dept: OBGYN CLINIC | Facility: CLINIC | Age: 20
End: 2025-08-21
Payer: COMMERCIAL

## 2025-08-21 VITALS
HEIGHT: 63 IN | WEIGHT: 176 LBS | BODY MASS INDEX: 31.18 KG/M2 | DIASTOLIC BLOOD PRESSURE: 68 MMHG | SYSTOLIC BLOOD PRESSURE: 114 MMHG

## 2025-08-21 DIAGNOSIS — Z30.09 GENERAL COUNSELING AND ADVICE FOR CONTRACEPTIVE MANAGEMENT: Primary | ICD-10-CM

## 2025-08-21 PROCEDURE — 99213 OFFICE O/P EST LOW 20 MIN: CPT | Performed by: STUDENT IN AN ORGANIZED HEALTH CARE EDUCATION/TRAINING PROGRAM
